# Patient Record
Sex: MALE | HISPANIC OR LATINO | Employment: OTHER | URBAN - METROPOLITAN AREA
[De-identification: names, ages, dates, MRNs, and addresses within clinical notes are randomized per-mention and may not be internally consistent; named-entity substitution may affect disease eponyms.]

---

## 2017-01-04 ENCOUNTER — GENERIC CONVERSION - ENCOUNTER (OUTPATIENT)
Dept: OTHER | Facility: OTHER | Age: 37
End: 2017-01-04

## 2017-02-06 ENCOUNTER — ALLSCRIPTS OFFICE VISIT (OUTPATIENT)
Dept: OTHER | Facility: OTHER | Age: 37
End: 2017-02-06

## 2017-02-06 ENCOUNTER — TRANSCRIBE ORDERS (OUTPATIENT)
Dept: ADMINISTRATIVE | Facility: HOSPITAL | Age: 37
End: 2017-02-06

## 2017-02-06 DIAGNOSIS — I50.42 CHRONIC COMBINED SYSTOLIC AND DIASTOLIC HEART FAILURE (HCC): Primary | ICD-10-CM

## 2017-03-06 DIAGNOSIS — I50.42 CHRONIC COMBINED SYSTOLIC AND DIASTOLIC HEART FAILURE (HCC): ICD-10-CM

## 2017-03-24 ENCOUNTER — APPOINTMENT (OUTPATIENT)
Dept: CT IMAGING | Facility: HOSPITAL | Age: 37
End: 2017-03-24

## 2017-03-24 ENCOUNTER — APPOINTMENT (EMERGENCY)
Dept: RADIOLOGY | Facility: HOSPITAL | Age: 37
End: 2017-03-24

## 2017-03-24 ENCOUNTER — GENERIC CONVERSION - ENCOUNTER (OUTPATIENT)
Dept: OTHER | Facility: OTHER | Age: 37
End: 2017-03-24

## 2017-03-24 ENCOUNTER — HOSPITAL ENCOUNTER (OUTPATIENT)
Facility: HOSPITAL | Age: 37
Setting detail: OBSERVATION
Discharge: LEFT BEFORE DISCHARGE | End: 2017-03-25
Attending: EMERGENCY MEDICINE | Admitting: INTERNAL MEDICINE

## 2017-03-24 DIAGNOSIS — I50.9 CONGESTIVE HEART FAILURE, UNSPECIFIED CONGESTIVE HEART FAILURE CHRONICITY, UNSPECIFIED CONGESTIVE HEART FAILURE TYPE: ICD-10-CM

## 2017-03-24 DIAGNOSIS — R07.9 CHEST PAIN: Primary | ICD-10-CM

## 2017-03-24 DIAGNOSIS — R07.9 EXERTIONAL CHEST PAIN: ICD-10-CM

## 2017-03-24 DIAGNOSIS — R77.8 ELEVATED TROPONIN: ICD-10-CM

## 2017-03-24 PROBLEM — R73.9 HYPERGLYCEMIA: Status: ACTIVE | Noted: 2017-03-24

## 2017-03-24 PROBLEM — D72.829 LEUKOCYTOSIS: Status: ACTIVE | Noted: 2017-03-24

## 2017-03-24 LAB
AMPHETAMINES SERPL QL SCN: NEGATIVE
ANION GAP SERPL CALCULATED.3IONS-SCNC: 9 MMOL/L (ref 4–13)
APTT PPP: 29 SECONDS (ref 24–36)
BACTERIA UR QL AUTO: ABNORMAL /HPF
BARBITURATES UR QL: NEGATIVE
BASOPHILS # BLD AUTO: 0.06 THOUSANDS/ΜL (ref 0–0.1)
BASOPHILS NFR BLD AUTO: 1 % (ref 0–1)
BENZODIAZ UR QL: NEGATIVE
BILIRUB UR QL STRIP: NEGATIVE
BUN SERPL-MCNC: 19 MG/DL (ref 5–25)
CALCIUM SERPL-MCNC: 9 MG/DL (ref 8.3–10.1)
CHLORIDE SERPL-SCNC: 102 MMOL/L (ref 100–108)
CLARITY UR: CLEAR
CO2 SERPL-SCNC: 29 MMOL/L (ref 21–32)
COCAINE UR QL: NEGATIVE
COLOR UR: YELLOW
CREAT SERPL-MCNC: 1.1 MG/DL (ref 0.6–1.3)
EOSINOPHIL # BLD AUTO: 0.25 THOUSAND/ΜL (ref 0–0.61)
EOSINOPHIL NFR BLD AUTO: 2 % (ref 0–6)
ERYTHROCYTE [DISTWIDTH] IN BLOOD BY AUTOMATED COUNT: 12.3 % (ref 11.6–15.1)
GFR SERPL CREATININE-BSD FRML MDRD: >60 ML/MIN/1.73SQ M
GLUCOSE SERPL-MCNC: 162 MG/DL (ref 65–140)
GLUCOSE UR STRIP-MCNC: NEGATIVE MG/DL
HCT VFR BLD AUTO: 53.2 % (ref 36.5–49.3)
HGB BLD-MCNC: 17.2 G/DL (ref 12–17)
HGB UR QL STRIP.AUTO: ABNORMAL
INR PPP: 1.01 (ref 0.86–1.16)
KETONES UR STRIP-MCNC: NEGATIVE MG/DL
LEUKOCYTE ESTERASE UR QL STRIP: NEGATIVE
LYMPHOCYTES # BLD AUTO: 3.05 THOUSANDS/ΜL (ref 0.6–4.47)
LYMPHOCYTES NFR BLD AUTO: 28 % (ref 14–44)
MCH RBC QN AUTO: 29.2 PG (ref 26.8–34.3)
MCHC RBC AUTO-ENTMCNC: 32.3 G/DL (ref 31.4–37.4)
MCV RBC AUTO: 90 FL (ref 82–98)
METHADONE UR QL: NEGATIVE
MONOCYTES # BLD AUTO: 1.25 THOUSAND/ΜL (ref 0.17–1.22)
MONOCYTES NFR BLD AUTO: 11 % (ref 4–12)
NEUTROPHILS # BLD AUTO: 6.32 THOUSANDS/ΜL (ref 1.85–7.62)
NEUTS SEG NFR BLD AUTO: 57 % (ref 43–75)
NITRITE UR QL STRIP: NEGATIVE
NON-SQ EPI CELLS URNS QL MICRO: ABNORMAL /HPF
NRBC BLD AUTO-RTO: 0 /100 WBCS
NT-PROBNP SERPL-MCNC: 45 PG/ML
OPIATES UR QL SCN: NEGATIVE
PCP UR QL: NEGATIVE
PH UR STRIP.AUTO: 6 [PH] (ref 4.5–8)
PLATELET # BLD AUTO: 223 THOUSANDS/UL (ref 149–390)
PMV BLD AUTO: 12.5 FL (ref 8.9–12.7)
POTASSIUM SERPL-SCNC: 4.3 MMOL/L (ref 3.5–5.3)
PROT UR STRIP-MCNC: NEGATIVE MG/DL
PROTHROMBIN TIME: 13.2 SECONDS (ref 12–14.3)
RBC # BLD AUTO: 5.89 MILLION/UL (ref 3.88–5.62)
RBC #/AREA URNS AUTO: ABNORMAL /HPF
SODIUM SERPL-SCNC: 140 MMOL/L (ref 136–145)
SP GR UR STRIP.AUTO: 1.01 (ref 1–1.03)
SPECIMEN SOURCE: ABNORMAL
THC UR QL: NEGATIVE
TROPONIN I BLD-MCNC: 0.09 NG/ML (ref 0–0.08)
TROPONIN I SERPL-MCNC: 0.03 NG/ML
UROBILINOGEN UR QL STRIP.AUTO: 0.2 E.U./DL
WBC # BLD AUTO: 11.01 THOUSAND/UL (ref 4.31–10.16)
WBC #/AREA URNS AUTO: ABNORMAL /HPF

## 2017-03-24 PROCEDURE — 84484 ASSAY OF TROPONIN QUANT: CPT | Performed by: PHYSICIAN ASSISTANT

## 2017-03-24 PROCEDURE — 83880 ASSAY OF NATRIURETIC PEPTIDE: CPT | Performed by: EMERGENCY MEDICINE

## 2017-03-24 PROCEDURE — 85610 PROTHROMBIN TIME: CPT | Performed by: EMERGENCY MEDICINE

## 2017-03-24 PROCEDURE — 80307 DRUG TEST PRSMV CHEM ANLYZR: CPT | Performed by: PHYSICIAN ASSISTANT

## 2017-03-24 PROCEDURE — 93005 ELECTROCARDIOGRAM TRACING: CPT | Performed by: EMERGENCY MEDICINE

## 2017-03-24 PROCEDURE — 81001 URINALYSIS AUTO W/SCOPE: CPT | Performed by: PHYSICIAN ASSISTANT

## 2017-03-24 PROCEDURE — 36415 COLL VENOUS BLD VENIPUNCTURE: CPT | Performed by: EMERGENCY MEDICINE

## 2017-03-24 PROCEDURE — 84484 ASSAY OF TROPONIN QUANT: CPT

## 2017-03-24 PROCEDURE — 71020 HB CHEST X-RAY 2VW FRONTAL&LATL: CPT

## 2017-03-24 PROCEDURE — 80048 BASIC METABOLIC PNL TOTAL CA: CPT | Performed by: EMERGENCY MEDICINE

## 2017-03-24 PROCEDURE — 85025 COMPLETE CBC W/AUTO DIFF WBC: CPT | Performed by: EMERGENCY MEDICINE

## 2017-03-24 PROCEDURE — 71275 CT ANGIOGRAPHY CHEST: CPT

## 2017-03-24 PROCEDURE — 85730 THROMBOPLASTIN TIME PARTIAL: CPT | Performed by: EMERGENCY MEDICINE

## 2017-03-24 RX ORDER — FUROSEMIDE 20 MG/1
30 TABLET ORAL 2 TIMES DAILY
Status: ON HOLD | COMMUNITY
End: 2018-06-21

## 2017-03-24 RX ORDER — FUROSEMIDE 20 MG/1
30 TABLET ORAL 2 TIMES DAILY
Status: DISCONTINUED | OUTPATIENT
Start: 2017-03-24 | End: 2017-03-25 | Stop reason: HOSPADM

## 2017-03-24 RX ORDER — MORPHINE SULFATE 2 MG/ML
1 INJECTION, SOLUTION INTRAMUSCULAR; INTRAVENOUS EVERY 6 HOURS PRN
Status: DISCONTINUED | OUTPATIENT
Start: 2017-03-24 | End: 2017-03-25 | Stop reason: HOSPADM

## 2017-03-24 RX ORDER — ACETAMINOPHEN 325 MG/1
650 TABLET ORAL EVERY 4 HOURS PRN
Status: DISCONTINUED | OUTPATIENT
Start: 2017-03-24 | End: 2017-03-25 | Stop reason: HOSPADM

## 2017-03-24 RX ORDER — ASPIRIN 81 MG/1
81 TABLET, CHEWABLE ORAL DAILY
Status: DISCONTINUED | OUTPATIENT
Start: 2017-03-25 | End: 2017-03-25 | Stop reason: HOSPADM

## 2017-03-24 RX ORDER — SPIRONOLACTONE 25 MG/1
25 TABLET ORAL DAILY
Status: DISCONTINUED | OUTPATIENT
Start: 2017-03-25 | End: 2017-03-25 | Stop reason: HOSPADM

## 2017-03-24 RX ORDER — SPIRONOLACTONE 25 MG/1
25 TABLET ORAL DAILY
COMMUNITY
End: 2018-05-11 | Stop reason: SDUPTHER

## 2017-03-24 RX ORDER — NITROGLYCERIN 0.4 MG/1
0.4 TABLET SUBLINGUAL
Status: DISCONTINUED | OUTPATIENT
Start: 2017-03-24 | End: 2017-03-25 | Stop reason: HOSPADM

## 2017-03-24 RX ORDER — LISINOPRIL 20 MG/1
20 TABLET ORAL DAILY
COMMUNITY
End: 2018-06-22 | Stop reason: HOSPADM

## 2017-03-24 RX ORDER — METOPROLOL SUCCINATE 100 MG/1
150 TABLET, EXTENDED RELEASE ORAL DAILY
COMMUNITY
End: 2018-04-06 | Stop reason: SDUPTHER

## 2017-03-24 RX ORDER — ONDANSETRON 2 MG/ML
4 INJECTION INTRAMUSCULAR; INTRAVENOUS EVERY 6 HOURS PRN
Status: DISCONTINUED | OUTPATIENT
Start: 2017-03-24 | End: 2017-03-24

## 2017-03-24 RX ORDER — ASPIRIN 81 MG/1
324 TABLET, CHEWABLE ORAL ONCE
Status: COMPLETED | OUTPATIENT
Start: 2017-03-24 | End: 2017-03-24

## 2017-03-24 RX ADMIN — ASPIRIN 81 MG CHEWABLE TABLET 324 MG: 81 TABLET CHEWABLE at 20:36

## 2017-03-24 RX ADMIN — FUROSEMIDE 30 MG: 20 TABLET ORAL at 21:09

## 2017-03-24 RX ADMIN — IOHEXOL 85 ML: 350 INJECTION, SOLUTION INTRAVENOUS at 21:21

## 2017-03-25 VITALS
OXYGEN SATURATION: 96 % | HEART RATE: 93 BPM | TEMPERATURE: 97.7 F | WEIGHT: 280 LBS | RESPIRATION RATE: 18 BRPM | DIASTOLIC BLOOD PRESSURE: 80 MMHG | SYSTOLIC BLOOD PRESSURE: 135 MMHG

## 2017-03-25 PROBLEM — I50.42 CHRONIC COMBINED SYSTOLIC AND DIASTOLIC CONGESTIVE HEART FAILURE (HCC): Status: ACTIVE | Noted: 2017-03-25

## 2017-03-25 PROBLEM — I20.8 ANGINA OF EFFORT (HCC): Status: ACTIVE | Noted: 2017-03-25

## 2017-03-25 PROBLEM — K76.0 FATTY LIVER DISEASE, NONALCOHOLIC: Status: ACTIVE | Noted: 2017-03-25

## 2017-03-25 PROBLEM — I42.0 CARDIOMYOPATHY, DILATED, NONISCHEMIC (HCC): Status: ACTIVE | Noted: 2017-03-25

## 2017-03-25 PROBLEM — R91.1 INCIDENTAL PULMONARY NODULE, > 3MM AND < 8MM: Status: ACTIVE | Noted: 2017-03-25

## 2017-03-25 PROBLEM — R91.1 INCIDENTAL PULMONARY NODULE, GREATER THAN OR EQUAL TO 8MM: Status: ACTIVE | Noted: 2017-03-25

## 2017-03-25 LAB
ALBUMIN SERPL BCP-MCNC: 3.7 G/DL (ref 3.5–5)
ALP SERPL-CCNC: 113 U/L (ref 46–116)
ALT SERPL W P-5'-P-CCNC: 62 U/L (ref 12–78)
ANION GAP SERPL CALCULATED.3IONS-SCNC: 9 MMOL/L (ref 4–13)
APTT PPP: 20 SECONDS (ref 24–36)
AST SERPL W P-5'-P-CCNC: 27 U/L (ref 5–45)
BILIRUB DIRECT SERPL-MCNC: 0.16 MG/DL (ref 0–0.2)
BILIRUB SERPL-MCNC: 0.7 MG/DL (ref 0.2–1)
BUN SERPL-MCNC: 18 MG/DL (ref 5–25)
CALCIUM SERPL-MCNC: 8.3 MG/DL (ref 8.3–10.1)
CHLORIDE SERPL-SCNC: 101 MMOL/L (ref 100–108)
CHOLEST SERPL-MCNC: 189 MG/DL (ref 50–200)
CO2 SERPL-SCNC: 23 MMOL/L (ref 21–32)
CREAT SERPL-MCNC: 1.12 MG/DL (ref 0.6–1.3)
ERYTHROCYTE [DISTWIDTH] IN BLOOD BY AUTOMATED COUNT: 12.5 % (ref 11.6–15.1)
EST. AVERAGE GLUCOSE BLD GHB EST-MCNC: 160 MG/DL
GFR SERPL CREATININE-BSD FRML MDRD: >60 ML/MIN/1.73SQ M
GLUCOSE SERPL-MCNC: 247 MG/DL (ref 65–140)
HBA1C MFR BLD: 7.2 % (ref 4.2–6.3)
HCT VFR BLD AUTO: 53.5 % (ref 36.5–49.3)
HDLC SERPL-MCNC: 32 MG/DL (ref 40–60)
HGB BLD-MCNC: 16.8 G/DL (ref 12–17)
INR PPP: 1.04 (ref 0.86–1.16)
LDLC SERPL CALC-MCNC: 118 MG/DL (ref 0–100)
MAGNESIUM SERPL-MCNC: 2.2 MG/DL (ref 1.6–2.6)
MCH RBC QN AUTO: 29.2 PG (ref 26.8–34.3)
MCHC RBC AUTO-ENTMCNC: 31.4 G/DL (ref 31.4–37.4)
MCV RBC AUTO: 93 FL (ref 82–98)
PLATELET # BLD AUTO: 205 THOUSANDS/UL (ref 149–390)
PMV BLD AUTO: 11.9 FL (ref 8.9–12.7)
POTASSIUM SERPL-SCNC: 5.7 MMOL/L (ref 3.5–5.3)
PROT SERPL-MCNC: 8.3 G/DL (ref 6.4–8.2)
PROTHROMBIN TIME: 13.5 SECONDS (ref 12–14.3)
RBC # BLD AUTO: 5.75 MILLION/UL (ref 3.88–5.62)
SODIUM SERPL-SCNC: 133 MMOL/L (ref 136–145)
TRIGL SERPL-MCNC: 197 MG/DL
TROPONIN I SERPL-MCNC: 0.02 NG/ML
TROPONIN I SERPL-MCNC: 0.03 NG/ML
WBC # BLD AUTO: 12.67 THOUSAND/UL (ref 4.31–10.16)

## 2017-03-25 PROCEDURE — 83735 ASSAY OF MAGNESIUM: CPT | Performed by: PHYSICIAN ASSISTANT

## 2017-03-25 PROCEDURE — 84484 ASSAY OF TROPONIN QUANT: CPT | Performed by: PHYSICIAN ASSISTANT

## 2017-03-25 PROCEDURE — 99285 EMERGENCY DEPT VISIT HI MDM: CPT

## 2017-03-25 PROCEDURE — 86705 HEP B CORE ANTIBODY IGM: CPT | Performed by: INTERNAL MEDICINE

## 2017-03-25 PROCEDURE — 36415 COLL VENOUS BLD VENIPUNCTURE: CPT | Performed by: PHYSICIAN ASSISTANT

## 2017-03-25 PROCEDURE — 85610 PROTHROMBIN TIME: CPT | Performed by: PHYSICIAN ASSISTANT

## 2017-03-25 PROCEDURE — 85730 THROMBOPLASTIN TIME PARTIAL: CPT | Performed by: PHYSICIAN ASSISTANT

## 2017-03-25 PROCEDURE — 86803 HEPATITIS C AB TEST: CPT | Performed by: INTERNAL MEDICINE

## 2017-03-25 PROCEDURE — 87340 HEPATITIS B SURFACE AG IA: CPT | Performed by: INTERNAL MEDICINE

## 2017-03-25 PROCEDURE — 86704 HEP B CORE ANTIBODY TOTAL: CPT | Performed by: INTERNAL MEDICINE

## 2017-03-25 PROCEDURE — 80048 BASIC METABOLIC PNL TOTAL CA: CPT | Performed by: PHYSICIAN ASSISTANT

## 2017-03-25 PROCEDURE — 80061 LIPID PANEL: CPT | Performed by: PHYSICIAN ASSISTANT

## 2017-03-25 PROCEDURE — 83036 HEMOGLOBIN GLYCOSYLATED A1C: CPT | Performed by: PHYSICIAN ASSISTANT

## 2017-03-25 PROCEDURE — 85027 COMPLETE CBC AUTOMATED: CPT | Performed by: PHYSICIAN ASSISTANT

## 2017-03-25 PROCEDURE — 80076 HEPATIC FUNCTION PANEL: CPT | Performed by: INTERNAL MEDICINE

## 2017-03-25 RX ORDER — ASPIRIN 81 MG/1
81 TABLET, CHEWABLE ORAL DAILY
Qty: 30 TABLET | Refills: 0 | Status: ON HOLD | COMMUNITY
Start: 2017-03-25 | End: 2018-06-21

## 2017-03-25 RX ADMIN — ASPIRIN 81 MG CHEWABLE TABLET 81 MG: 81 TABLET CHEWABLE at 09:14

## 2017-03-25 RX ADMIN — FUROSEMIDE 30 MG: 20 TABLET ORAL at 09:14

## 2017-03-25 RX ADMIN — SPIRONOLACTONE 25 MG: 25 TABLET, FILM COATED ORAL at 09:11

## 2017-03-25 RX ADMIN — METOPROLOL SUCCINATE 150 MG: 50 TABLET, FILM COATED, EXTENDED RELEASE ORAL at 09:10

## 2017-03-25 RX ADMIN — LISINOPRIL 25 MG: 20 TABLET ORAL at 09:09

## 2017-03-26 LAB
HBV CORE AB SER QL: NORMAL
HBV CORE IGM SER QL: NORMAL
HBV SURFACE AG SER QL: NORMAL
HCV AB SER QL: NORMAL

## 2017-04-04 LAB
ATRIAL RATE: 100 BPM
P AXIS: 52 DEGREES
PR INTERVAL: 134 MS
QRS AXIS: -25 DEGREES
QRSD INTERVAL: 96 MS
QT INTERVAL: 370 MS
QTC INTERVAL: 477 MS
T WAVE AXIS: 56 DEGREES
VENTRICULAR RATE: 100 BPM

## 2018-01-09 NOTE — MISCELLANEOUS
To whom it may concern,             Mr Yulisa Ríos is a patient of mine whom I treat for cardiomyopathy and chronic systolic CHF  He is not do do any shoveling  He may have trouble at times climbing up into   his  truck based on exacerbations of CHFand hand and feet swelling  Please excuse him on these days   Thank you,    Sincerely,  Berlin GARCIA   [phone]      Electronically signed by:Conner Chapman DO  Jan 9 2017  2:58PM EST      Electronically signed by:Conner Chapman DO  Jan 9 2017  2:58PM EST Author

## 2018-01-14 VITALS
DIASTOLIC BLOOD PRESSURE: 86 MMHG | HEIGHT: 70 IN | HEART RATE: 84 BPM | SYSTOLIC BLOOD PRESSURE: 140 MMHG | OXYGEN SATURATION: 98 % | WEIGHT: 284.44 LBS | BODY MASS INDEX: 40.72 KG/M2

## 2018-04-06 DIAGNOSIS — I42.9 CARDIOMYOPATHY, UNSPECIFIED TYPE (HCC): Primary | ICD-10-CM

## 2018-04-06 RX ORDER — METOPROLOL SUCCINATE 100 MG/1
150 TABLET, EXTENDED RELEASE ORAL DAILY
Qty: 45 TABLET | Refills: 5 | Status: SHIPPED | OUTPATIENT
Start: 2018-04-06 | End: 2018-11-02 | Stop reason: SDUPTHER

## 2018-05-11 DIAGNOSIS — I10 HYPERTENSION, UNSPECIFIED TYPE: Primary | ICD-10-CM

## 2018-05-11 RX ORDER — SPIRONOLACTONE 25 MG/1
25 TABLET ORAL DAILY
Qty: 90 TABLET | Refills: 5 | Status: SHIPPED | OUTPATIENT
Start: 2018-05-11 | End: 2019-06-27 | Stop reason: SDUPTHER

## 2018-06-19 ENCOUNTER — APPOINTMENT (EMERGENCY)
Dept: CT IMAGING | Facility: HOSPITAL | Age: 38
End: 2018-06-19
Payer: COMMERCIAL

## 2018-06-19 ENCOUNTER — APPOINTMENT (EMERGENCY)
Dept: RADIOLOGY | Facility: HOSPITAL | Age: 38
End: 2018-06-19
Payer: COMMERCIAL

## 2018-06-19 ENCOUNTER — HOSPITAL ENCOUNTER (OUTPATIENT)
Facility: HOSPITAL | Age: 38
Setting detail: OBSERVATION
Discharge: HOME | End: 2018-06-22
Attending: EMERGENCY MEDICINE | Admitting: INTERNAL MEDICINE
Payer: COMMERCIAL

## 2018-06-19 DIAGNOSIS — I42.0 CARDIOMYOPATHY, DILATED, NONISCHEMIC (HCC): ICD-10-CM

## 2018-06-19 DIAGNOSIS — R07.9 CHEST PAIN: Primary | ICD-10-CM

## 2018-06-19 DIAGNOSIS — R94.31 ABNORMAL EKG: ICD-10-CM

## 2018-06-19 PROBLEM — I50.22 CHRONIC SYSTOLIC HEART FAILURE (HCC): Chronic | Status: ACTIVE | Noted: 2018-06-19

## 2018-06-19 LAB
ALBUMIN SERPL BCP-MCNC: 4.1 G/DL (ref 3.5–5)
ALP SERPL-CCNC: 85 U/L (ref 46–116)
ALT SERPL W P-5'-P-CCNC: 35 U/L (ref 12–78)
ANION GAP SERPL CALCULATED.3IONS-SCNC: 7 MMOL/L (ref 4–13)
AST SERPL W P-5'-P-CCNC: 20 U/L (ref 5–45)
ATRIAL RATE: 101 BPM
BASOPHILS # BLD AUTO: 0.05 THOUSANDS/ΜL (ref 0–0.1)
BASOPHILS NFR BLD AUTO: 0 % (ref 0–1)
BILIRUB SERPL-MCNC: 0.7 MG/DL (ref 0.2–1)
BUN SERPL-MCNC: 20 MG/DL (ref 5–25)
CALCIUM SERPL-MCNC: 9.1 MG/DL (ref 8.3–10.1)
CHLORIDE SERPL-SCNC: 102 MMOL/L (ref 100–108)
CO2 SERPL-SCNC: 30 MMOL/L (ref 21–32)
CREAT SERPL-MCNC: 1.44 MG/DL (ref 0.6–1.3)
DEPRECATED D DIMER PPP: 490 NG/ML (FEU) (ref 0–424)
EOSINOPHIL # BLD AUTO: 0.15 THOUSAND/ΜL (ref 0–0.61)
EOSINOPHIL NFR BLD AUTO: 1 % (ref 0–6)
ERYTHROCYTE [DISTWIDTH] IN BLOOD BY AUTOMATED COUNT: 12.6 % (ref 11.6–15.1)
GFR SERPL CREATININE-BSD FRML MDRD: 61 ML/MIN/1.73SQ M
GLUCOSE SERPL-MCNC: 113 MG/DL (ref 65–140)
GLUCOSE SERPL-MCNC: 170 MG/DL (ref 65–140)
GLUCOSE SERPL-MCNC: 94 MG/DL (ref 65–140)
GLUCOSE SERPL-MCNC: 98 MG/DL (ref 65–140)
HCT VFR BLD AUTO: 52.2 % (ref 36.5–49.3)
HGB BLD-MCNC: 16.9 G/DL (ref 12–17)
IMM GRANULOCYTES # BLD AUTO: 0.04 THOUSAND/UL (ref 0–0.2)
IMM GRANULOCYTES NFR BLD AUTO: 0 % (ref 0–2)
LYMPHOCYTES # BLD AUTO: 1.7 THOUSANDS/ΜL (ref 0.6–4.47)
LYMPHOCYTES NFR BLD AUTO: 14 % (ref 14–44)
MCH RBC QN AUTO: 29 PG (ref 26.8–34.3)
MCHC RBC AUTO-ENTMCNC: 32.4 G/DL (ref 31.4–37.4)
MCV RBC AUTO: 90 FL (ref 82–98)
MONOCYTES # BLD AUTO: 1.3 THOUSAND/ΜL (ref 0.17–1.22)
MONOCYTES NFR BLD AUTO: 11 % (ref 4–12)
NEUTROPHILS # BLD AUTO: 8.82 THOUSANDS/ΜL (ref 1.85–7.62)
NEUTS SEG NFR BLD AUTO: 74 % (ref 43–75)
NRBC BLD AUTO-RTO: 0 /100 WBCS
NT-PROBNP SERPL-MCNC: 159 PG/ML
P AXIS: 66 DEGREES
PLATELET # BLD AUTO: 225 THOUSANDS/UL (ref 149–390)
PMV BLD AUTO: 11 FL (ref 8.9–12.7)
POTASSIUM SERPL-SCNC: 4 MMOL/L (ref 3.5–5.3)
PR INTERVAL: 140 MS
PROT SERPL-MCNC: 8.6 G/DL (ref 6.4–8.2)
QRS AXIS: 13 DEGREES
QRSD INTERVAL: 96 MS
QT INTERVAL: 374 MS
QTC INTERVAL: 484 MS
RBC # BLD AUTO: 5.83 MILLION/UL (ref 3.88–5.62)
SODIUM SERPL-SCNC: 139 MMOL/L (ref 136–145)
T WAVE AXIS: 58 DEGREES
TROPONIN I SERPL-MCNC: 0.02 NG/ML
TROPONIN I SERPL-MCNC: 0.05 NG/ML
VENTRICULAR RATE: 101 BPM
WBC # BLD AUTO: 12.06 THOUSAND/UL (ref 4.31–10.16)

## 2018-06-19 PROCEDURE — 71275 CT ANGIOGRAPHY CHEST: CPT

## 2018-06-19 PROCEDURE — 83880 ASSAY OF NATRIURETIC PEPTIDE: CPT | Performed by: EMERGENCY MEDICINE

## 2018-06-19 PROCEDURE — 99220 PR INITIAL OBSERVATION CARE/DAY 70 MINUTES: CPT | Performed by: INTERNAL MEDICINE

## 2018-06-19 PROCEDURE — 36415 COLL VENOUS BLD VENIPUNCTURE: CPT | Performed by: EMERGENCY MEDICINE

## 2018-06-19 PROCEDURE — 84484 ASSAY OF TROPONIN QUANT: CPT | Performed by: EMERGENCY MEDICINE

## 2018-06-19 PROCEDURE — 85025 COMPLETE CBC W/AUTO DIFF WBC: CPT | Performed by: EMERGENCY MEDICINE

## 2018-06-19 PROCEDURE — 71046 X-RAY EXAM CHEST 2 VIEWS: CPT

## 2018-06-19 PROCEDURE — 80053 COMPREHEN METABOLIC PANEL: CPT | Performed by: EMERGENCY MEDICINE

## 2018-06-19 PROCEDURE — 96374 THER/PROPH/DIAG INJ IV PUSH: CPT

## 2018-06-19 PROCEDURE — 93005 ELECTROCARDIOGRAM TRACING: CPT

## 2018-06-19 PROCEDURE — 99285 EMERGENCY DEPT VISIT HI MDM: CPT

## 2018-06-19 PROCEDURE — 93010 ELECTROCARDIOGRAM REPORT: CPT | Performed by: INTERNAL MEDICINE

## 2018-06-19 PROCEDURE — 85379 FIBRIN DEGRADATION QUANT: CPT | Performed by: EMERGENCY MEDICINE

## 2018-06-19 PROCEDURE — 82948 REAGENT STRIP/BLOOD GLUCOSE: CPT

## 2018-06-19 PROCEDURE — 84484 ASSAY OF TROPONIN QUANT: CPT | Performed by: INTERNAL MEDICINE

## 2018-06-19 RX ORDER — FUROSEMIDE 20 MG/1
30 TABLET ORAL 2 TIMES DAILY
Status: DISCONTINUED | OUTPATIENT
Start: 2018-06-19 | End: 2018-06-21

## 2018-06-19 RX ORDER — ACETAMINOPHEN 325 MG/1
650 TABLET ORAL EVERY 6 HOURS PRN
Status: DISCONTINUED | OUTPATIENT
Start: 2018-06-19 | End: 2018-06-22 | Stop reason: HOSPADM

## 2018-06-19 RX ORDER — KETOROLAC TROMETHAMINE 30 MG/ML
15 INJECTION, SOLUTION INTRAMUSCULAR; INTRAVENOUS ONCE
Status: COMPLETED | OUTPATIENT
Start: 2018-06-19 | End: 2018-06-19

## 2018-06-19 RX ORDER — ASPIRIN 81 MG/1
81 TABLET ORAL DAILY
Status: DISCONTINUED | OUTPATIENT
Start: 2018-06-20 | End: 2018-06-22 | Stop reason: HOSPADM

## 2018-06-19 RX ORDER — NITROGLYCERIN 0.4 MG/1
0.4 TABLET SUBLINGUAL
Status: DISCONTINUED | OUTPATIENT
Start: 2018-06-19 | End: 2018-06-22 | Stop reason: HOSPADM

## 2018-06-19 RX ORDER — ASPIRIN 81 MG/1
324 TABLET, CHEWABLE ORAL ONCE
Status: COMPLETED | OUTPATIENT
Start: 2018-06-19 | End: 2018-06-19

## 2018-06-19 RX ADMIN — IOHEXOL 85 ML: 350 INJECTION, SOLUTION INTRAVENOUS at 15:22

## 2018-06-19 RX ADMIN — FUROSEMIDE 30 MG: 20 TABLET ORAL at 21:12

## 2018-06-19 RX ADMIN — KETOROLAC TROMETHAMINE 15 MG: 30 INJECTION, SOLUTION INTRAMUSCULAR at 14:24

## 2018-06-19 RX ADMIN — ASPIRIN 81 MG 324 MG: 81 TABLET ORAL at 15:12

## 2018-06-19 RX ADMIN — SODIUM CHLORIDE 500 ML: 0.9 INJECTION, SOLUTION INTRAVENOUS at 16:56

## 2018-06-19 NOTE — H&P
History and Physical - [facility] Internal Medicine    Patient Information: Feng Arriaga 42 y o  male MRN: [id number]  Unit/Bed#: -01 Encounter: [id number]  Admitting Physician: Beulah Arriola DO  PCP: Vicky Mccarthy MD  Date of Admission:  06/19/18    Assessment/Plan:    Hospital Problem List:     Principal Problem:    Chest pain  Active Problems:    Chronic systolic heart failure ([address] )      Plan for the Primary Problem(s):    # CP - sounds atypical however significant risk factors and given EKG changes will admit to r/o for acs  - consult cardiology  - cardiac cath in 2015 no evidence of disease  - Obtain echocardiogram, NM stress  - Serial troponin  - prn nitro  - ASA daily  - Further risk stratify pt thus will obtain lipid profile  - cont telemetry monitoring  - Ruled out for PE    Plan for Additional Problems:   # chronic systolic HF, ef of 12-52 %, NICM s/p cardiac cath   - compensated  - Most recent echo in 2015, is actually due to have one since 2017 but has been non compliant will obtain now  - cont lasix    # CKD stg III - though slight cr bump from last reading which was in 3/2017 still w/in baseline range  - will hold lisinopril/aldactone for now as bp on low side as well; but if cr remains stable would rec to cont    # Leukocytosis - chronic, no sign of acute infectious process; further wkup as outpatient    # DM II, - diet controlled, last HgA1c dof 7 2 (3/2017)   - ISS  - Check hgA1c    # HTN - stable    # Pulmonary nodule RML, known hx - from last admission, size remains the same from prior imaging  - serial imaging yearly    VTE Prophylaxis: Low risk  / sequential compression device   Code Status:  Full code  POLST: There is no POLST form on file for this patient (pre-hospital)    Anticipated Length of Stay:  Patient will be admitted on an Observation basis with an anticipated length of stay of  less than 2 midnights     Justification for Hospital Stay:  Rule out ACS    Total Time for Visit, including Counseling / Coordination of Care: 1 hour  Greater than 50% of this total time spent on direct patient counseling and coordination of care  Chief Complaint:   Chest pain    History of Present Illness:    Umm Altamirano is a 42 y o  male medical history significant for 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, hypertension, type 2 diabetes presents with chest pain  Patient states he was in his usual state of health until this afternoon  He had gotten out of his his truck into his car , he states that due to the ambient weather he felt extremely hot  Subsequently had chest pain/tightness in midsternum, no radiation associated with mild shortness of breath  States that his symptoms persisted until he presented to the ED  At this point he says his symptoms have essentially resolved  He had been admitted in 2017 for chest pain plan was to proceed with stress test however patient signed out against medical advice  Due to his history of heart failure follows up with Dr Brandy Reilly but he admits that he has not seen him in over a year  He has also been ordered for an echocardiogram which he has been noncompliant with as well     Review of Systems:    Review of Systems   Constitutional: Negative  HENT: Negative  Eyes: Negative  Respiratory: Negative  Cardiovascular: Positive for chest pain  Gastrointestinal: Negative  Endocrine: Negative  Genitourinary: Negative  Musculoskeletal: Negative  Skin: Negative  Allergic/Immunologic: Negative  Neurological: Negative  Hematological: Negative  Psychiatric/Behavioral: Negative          Past Medical and Surgical History:     Past Medical History:   Diagnosis Date    CHF (congestive heart failure) (HCC)     Chronic systolic heart failure EF of 40-45%  Type 2 diabetes, non insulin dependent, diet controlled  Hypertension  Chronic kidney disease stage 3    Past Surgical History:   Procedure Laterality Date    CARDIAC CATHETERIZATION         Meds/Allergies:    Prior to Admission medications    Medication Sig Start Date End Date Taking? Authorizing Provider   aspirin 81 mg chewable tablet Chew 1 tablet daily for 30 days 3/25/17 4/24/17  Dima Ramos MD   furosemide (LASIX) 20 mg tablet Take 30 mg by mouth 2 (two) times a day    Historical Provider, MD   lisinopril (ZESTRIL) 20 mg tablet Take 20 mg by mouth daily      Historical Provider, MD   metoprolol succinate (TOPROL-XL) 100 mg 24 hr tablet Take 1 5 tablets (150 mg total) by mouth daily 4/6/18 4/6/19  Maki Ramos DO   spironolactone (ALDACTONE) 25 mg tablet Take 1 tablet (25 mg total) by mouth daily 5/11/18   Maki Ramos DO     I have reviewed home medications with patient personally  Allergies:    Allergies   Allergen Reactions    Prednisone Shortness Of Breath and Edema       Social History:     Marital Status: /Civil Union   Occupation: Works as a   Patient Pre-hospital Living Situation: Resides with wife  Patient Pre-hospital Level of Mobility: Independent  Patient Pre-hospital Diet Restrictions: none  Substance Use History:   History   Alcohol Use No     History   Smoking Status    Former Smoker   Smokeless Tobacco    Never Used     History   Drug Use No       Family History:    non-contributory    Physical Exam:     Vitals:   Blood Pressure: 116/59 (06/19/18 1657)  Pulse: 81 (06/19/18 1657)  Temperature: 99 °F (37 2 °C) (06/19/18 1314)  Temp Source: Oral (06/19/18 1314)  Respirations: 16 (06/19/18 1657)  Height: 5' 10" (177 8 cm) (06/19/18 1314)  Weight - Scale: 113 kg (250 lb) (06/19/18 1314)  SpO2: 94 % (06/19/18 1657)    General Appearance:  Alert, cooperative, no distress, appears stated age   Head:  Normocephalic, without obvious abnormality, atraumatic   Neck: Supple   Lungs:   Clear to auscultation bilaterally, respirations unlabored   Chest Wall:  No tenderness or deformity    Heart:  Regular rate and rhythm, S1 and S2 normal, no murmur, rub or gallop   Abdomen:   Soft, non-tender, bowel sounds active all four quadrants,  no masses, no organomegaly   Extremities: Extremities normal, atraumatic, no cyanosis or edema   Pulses: 2+ and symmetric all extremities   Skin: Skin color, texture, turgor normal, no rashes or lesions   Lymph nodes:    Neurologic: CNII-XII intact, speech fluent, comprehensible, no facial asymmetry; normal strength 5/5 in all major muscle groups, sensation and reflexes throughout       Additional Data:     Lab Results: I have personally reviewed pertinent reports  Results from last 7 days  Lab Units 06/19/18  1422   WBC Thousand/uL 12 06*   HEMOGLOBIN g/dL 16 9   HEMATOCRIT % 52 2*   PLATELETS Thousands/uL 225   NEUTROS PCT % 74   LYMPHS PCT % 14   MONOS PCT % 11   EOS PCT % 1       Results from last 7 days  Lab Units 06/19/18  1422   SODIUM mmol/L 139   POTASSIUM mmol/L 4 0   CHLORIDE mmol/L 102   CO2 mmol/L 30   BUN mg/dL 20   CREATININE mg/dL 1 44*   CALCIUM mg/dL 9 1   TOTAL PROTEIN g/dL 8 6*   BILIRUBIN TOTAL mg/dL 0 70   ALK PHOS U/L 85   ALT U/L 35   AST U/L 20   GLUCOSE RANDOM mg/dL 98           Imaging: I have personally reviewed pertinent reports  Xr Chest 2 Views    Result Date: 6/19/2018  Narrative: CHEST INDICATION:   chest pain  Shortness of breath  COMPARISON:  March 24, 2017  EXAM PERFORMED/VIEWS:  XR CHEST PA & LATERAL FINDINGS: Cardiomediastinal silhouette appears unremarkable  The lungs are clear  No pneumothorax or pleural effusion  Osseous structures appear within normal limits for patient age  Impression: No acute cardiopulmonary disease  Workstation performed: [id number]     [facility] Ed Chest Pe Study    Result Date: 6/19/2018  Narrative: CTA - CHEST WITH IV CONTRAST - PULMONARY ANGIOGRAM INDICATION:   Sudden onset chest pain/pressure    COMPARISON: 5/24/2017   TECHNIQUE: CTA examination of the chest was performed using angiographic technique according to a protocol specifically tailored to evaluate for pulmonary embolism  Axial, sagittal, and coronal 2D reformatted images were created from the source data and  submitted for interpretation  In addition, coronal 3D MIP postprocessing was performed on the acquisition scanner  Radiation dose length product (DLP) for this visit:  546 mGy-cm   This examination, like all CT scans performed in the [facility], was performed utilizing techniques to minimize radiation dose exposure, including the use of iterative reconstruction and automated exposure control  IV Contrast:  85 mL of iohexol (OMNIPAQUE)  FINDINGS: PULMONARY ARTERIAL TREE:  No pulmonary embolus is seen  LUNGS:  No infiltrates  Stable 7 mm right middle lobe nodule on image 3/36  There is no tracheal or endobronchial lesion  PLEURA:  Unremarkable  HEART/AORTA:  Top normal heart size  No pericardial effusion  Normal thoracic aorta  MEDIASTINUM AND JENNI:  Unremarkable  CHEST WALL AND LOWER NECK:   Unremarkable  VISUALIZED STRUCTURES IN THE UPPER ABDOMEN:  Fatty infiltration of the liver again noted  OSSEOUS STRUCTURES:  No acute fracture or destructive osseous lesion  Impression: No acute pathology  No pulmonary embolism  Stable 7 mm right middle lobe nodule  Fatty liver  Workstation performed: [id number]       EKG, Pathology, and Other Studies Reviewed on Admission:   · EKG: ST @ 101 bpm, nl axis, TWI in leads V5-V6    [employer]scripts Records Reviewed: Yes     ** Please Note: Dragon 360 Dictation voice to text software may have been used in the creation of this document   **

## 2018-06-19 NOTE — ED PROVIDER NOTES
History  Chief Complaint   Patient presents with    Chest Pain     Patient presents with CP that began suddenly about 1 hour ago  Describes the pain as a tight pressure  HPI    This is a 41 yo M who presents today with chest pain  Patient states he had similar chest pain which is worse with exertion and had it today as well  States feels pressure and tightness substernal  Hx of systolic and diastolic heart failure with cardiomyopathy  Patient also states some shortness of breath with these symptoms  No fever or chills  No weakness  States no hx of DVT or PE but patient sedentary as a   No drug use  Impression: chest pain, cardiac workup  Since patient has multiple risk factors, will admit patient  EKG new depressions in V5, V6  D-dimer since low risk     Prior to Admission Medications   Prescriptions Last Dose Informant Patient Reported? Taking?   furosemide (LASIX) 40 mg tablet   Yes Yes   Sig: Take 40 mg by mouth every evening   furosemide (LASIX) 40 mg tablet   Yes Yes   Sig: Take 80 mg by mouth every evening   lisinopril (ZESTRIL) 20 mg tablet   Yes No   Sig: Take 20 mg by mouth daily     metoprolol succinate (TOPROL-XL) 100 mg 24 hr tablet   No No   Sig: Take 1 5 tablets (150 mg total) by mouth daily   spironolactone (ALDACTONE) 25 mg tablet   No No   Sig: Take 1 tablet (25 mg total) by mouth daily      Facility-Administered Medications: None       Past Medical History:   Diagnosis Date    CHF (congestive heart failure) ([facility] [address] )        Past Surgical History:   Procedure Laterality Date    CARDIAC CATHETERIZATION         History reviewed  No pertinent family history  I have reviewed and agree with the history as documented  Social History   Substance Use Topics    Smoking status: Former Smoker    Smokeless tobacco: Never Used    Alcohol use No        Review of Systems   Constitutional: Negative  Negative for diaphoresis and fever  HENT: Negative      Respiratory: Positive for shortness of breath  Negative for cough and wheezing  Cardiovascular: Positive for chest pain  Negative for palpitations and leg swelling  Gastrointestinal: Negative for abdominal distention, abdominal pain, nausea and vomiting  Genitourinary: Negative  Musculoskeletal: Negative  Skin: Negative  Neurological: Negative  Psychiatric/Behavioral: Negative  All other systems reviewed and are negative  Physical Exam  Physical Exam   Constitutional: He is oriented to person, place, and time  He appears well-developed and well-nourished  No distress  HENT:   Head: Normocephalic and atraumatic  Nose: Nose normal    Mouth/Throat: Oropharynx is clear and moist    Eyes: Conjunctivae and EOM are normal  Pupils are equal, round, and reactive to light  Neck: Normal range of motion  Neck supple  Cardiovascular: Normal rate, regular rhythm and normal heart sounds  No murmur heard  Pulmonary/Chest: Effort normal and breath sounds normal  No respiratory distress  He has no wheezes  He has no rales  Abdominal: Soft  Bowel sounds are normal  He exhibits no distension  There is no tenderness  There is no rebound and no guarding  Musculoskeletal: Normal range of motion  He exhibits no edema, tenderness or deformity  Neurological: He is alert and oriented to person, place, and time  No cranial nerve deficit  Skin: Skin is warm and dry  No rash noted  He is not diaphoretic  No pallor  Psychiatric: He has a normal mood and affect  Vitals reviewed        Vital Signs  ED Triage Vitals [06/19/18 1314]   Temperature Pulse Respirations Blood Pressure SpO2   99 °F (37 2 °C) (!) 113 22 147/84 98 %      Temp Source Heart Rate Source Patient Position - Orthostatic VS BP Location FiO2 (%)   Oral Monitor Sitting Left arm --      Pain Score       5           Vitals:    06/21/18 2300 06/22/18 0300 06/22/18 0700 06/22/18 1100   BP: 118/72 110/65 118/67 116/67   Pulse: 85 79 58 95   Patient Position - Orthostatic VS: Lying Lying Sitting Sitting       Visual Acuity      ED Medications  Medications   ketorolac (TORADOL) injection 15 mg (15 mg Intravenous Given 6/19/18 1424)   aspirin chewable tablet 324 mg (324 mg Oral Given 6/19/18 1512)   iohexol (OMNIPAQUE) 350 MG/ML injection (MULTI-DOSE) 85 mL (85 mL Intravenous Given 6/19/18 1522)   sodium chloride 0 9 % bolus 500 mL (0 mL Intravenous Stopped 6/19/18 1732)   regadenoson (LEXISCAN) injection 0 4 mg (0 4 mg Intravenous Given 6/20/18 1108)   perflutren lipid microsphere (DEFINITY) injection (0 6 mL/min Intravenous Given 6/20/18 1536)       Diagnostic Studies  Results Reviewed     Procedure Component Value Units Date/Time    B-type natriuretic peptide [[id number]]  (Abnormal) Collected:  06/19/18 1422    Lab Status:  Final result Specimen:  Blood from Arm, Right Updated:  06/19/18 1508     NT-proBNP 159 (H) pg/mL     Troponin I [[id number]]  (Normal) Collected:  06/19/18 1422    Lab Status:  Final result Specimen:  Blood from Arm, Right Updated:  06/19/18 1450     Troponin I 0 02 ng/mL     D-dimer, quantitative [[id number]]  (Abnormal) Collected:  06/19/18 1422    Lab Status:  Final result Specimen:  Blood from Arm, Right Updated:  06/19/18 1449     D-Dimer, Quant 490 (H) ng/ml (FEU)     Comprehensive metabolic panel [[id number]]  (Abnormal) Collected:  06/19/18 1422    Lab Status:  Final result Specimen:  Blood from Arm, Right Updated:  06/19/18 1448     Sodium 139 mmol/L      Potassium 4 0 mmol/L      Chloride 102 mmol/L      CO2 30 mmol/L      Anion Gap 7 mmol/L      BUN 20 mg/dL      Creatinine 1 44 (H) mg/dL      Glucose 98 mg/dL      Calcium 9 1 mg/dL      AST 20 U/L      ALT 35 U/L      Alkaline Phosphatase 85 U/L      Total Protein 8 6 (H) g/dL      Albumin 4 1 g/dL      Total Bilirubin 0 70 mg/dL      eGFR 61 ml/min/1 73sq m     Narrative:         National Kidney Disease Education Program recommendations are as follows:  GFR calculation is accurate only with a steady state creatinine  Chronic Kidney disease less than 60 ml/min/1 73 sq  meters  Kidney failure less than 15 ml/min/1 73 sq  meters  CBC and differential [[id number]]  (Abnormal) Collected:  06/19/18 1422    Lab Status:  Final result Specimen:  Blood from Arm, Right Updated:  06/19/18 1427     WBC 12 06 (H) Thousand/uL      RBC 5 83 (H) Million/uL      Hemoglobin 16 9 g/dL      Hematocrit 52 2 (H) %      MCV 90 fL      MCH 29 0 pg      MCHC 32 4 g/dL      RDW 12 6 %      MPV 11 0 fL      Platelets 673 Thousands/uL      nRBC 0 /100 WBCs      Neutrophils Relative 74 %      Immat GRANS % 0 %      Lymphocytes Relative 14 %      Monocytes Relative 11 %      Eosinophils Relative 1 %      Basophils Relative 0 %      Neutrophils Absolute 8 82 (H) Thousands/µL      Immature Grans Absolute 0 04 Thousand/uL      Lymphocytes Absolute 1 70 Thousands/µL      Monocytes Absolute 1 30 (H) Thousand/µL      Eosinophils Absolute 0 15 Thousand/µL      Basophils Absolute 0 05 Thousands/µL                  CTA ED chest PE study   Final Result by Ebenezer Nazario MD (06/19 5948)      No acute pathology  No pulmonary embolism  Stable 7 mm right middle lobe nodule  Fatty liver  Workstation performed: [id number]         XR chest 2 views   Final Result by Becca Clancy MD (06/19 7092)      No acute cardiopulmonary disease              Workstation performed: [id number]                    Procedures  ECG 12 Lead Documentation  Date/Time: 6/19/2018 3:59 PM  Performed by: Nico Little by: Diana Dorantes     Indications / Diagnosis:  Chest pain  Patient location:  ED  Previous ECG:     Previous ECG:  Compared to current    Similarity:  Changes noted  Interpretation:     Interpretation: abnormal    Rate:     ECG rate:  101    ECG rate assessment: tachycardic    Rhythm:     Rhythm: sinus rhythm    Ectopy:     Ectopy: none    QRS:     QRS axis:  Normal  ST segments:     ST segments:  Abnormal    Depression:  V5 and V6  T waves:     T waves: normal             Phone Contacts  ED Phone Contact    ED Course  ED Course as of Jun 23 0709   Tue Jun 19, 2018   1428 WBC: (!) 12 06   1450 D-DIMER QUANTITATIVE: (!) 56   1552 WillAdmit patient for cardiac workup          HEART Risk Score      Most Recent Value   History  1 Filed at: 06/19/2018 1552   ECG  1 Filed at: 06/19/2018 1552   Age  0 Filed at: 06/19/2018 1552   Risk Factors  2 Filed at: 06/19/2018 1552   Troponin  0 Filed at: 06/19/2018 1552   Heart Score Risk Calculator   History  1 Filed at: 06/19/2018 1552   ECG  1 Filed at: 06/19/2018 1552   Age  0 Filed at: 06/19/2018 1552   Risk Factors  2 Filed at: 06/19/2018 1552   Troponin  0 Filed at: 06/19/2018 1552   HEART Score  4 Filed at: 06/19/2018 1552   HEART Score  4 Filed at: 06/19/2018 1552                            MDM  Number of Diagnoses or Management Options  Chest pain: new and requires workup  Diagnosis management comments: This is a 75-year-old male with a significant cardiac history who presents today with chest pain  Will do a cardiac workup along with D-dimer for to rule out PE since patient is a  mostly immobile  Will give aspirin since patient does have some EKG changes which are new from prior  Patient does have history of LVH on EKG but does have some depressions laterally  Will give full aspirin          Amount and/or Complexity of Data Reviewed  Clinical lab tests: ordered and reviewed  Tests in the radiology section of CPT®: ordered and reviewed  Tests in the medicine section of CPT®: ordered and reviewed    Risk of Complications, Morbidity, and/or Mortality  Presenting problems: moderate  Diagnostic procedures: moderate  Management options: moderate    Patient Progress  Patient progress: stable    CritCare Time    Disposition  Final diagnoses:   Chest pain     Time reflects when diagnosis was documented in both MDM as applicable and the Disposition within this note     Time User Action Codes Description Comment    6/19/2018  3:55 PM Remedios Azar Add [R07 9] Chest pain     6/19/2018  6:34 PM Roselyn Vanessa Add [R94 31] Abnormal EKG     6/21/2018  2:50 PM María Chase Add [I42 0] Cardiomyopathy, dilated, nonischemic [facility])       ED Disposition     ED Disposition Condition Comment    Admit  Case was discussed with slim and the patient's admission status was agreed to be Admission Status: observation status to the service of Dr Justin Martinez   Follow-up Information     Follow up With Specialties Details Why Contact Info    Sonja Dick MD Family Medicine Call in 1 week(s)  [address]  Cardiology Call pt already has an appointment [place] [phone] [facility]  [phone]            Discharge Medication List as of 6/22/2018 11:56 AM      START taking these medications    Details   aspirin (ECOTRIN LOW STRENGTH) 81 mg EC tablet Take 1 tablet (81 mg total) by mouth daily, Starting Fri 6/22/2018, No Print         CONTINUE these medications which have CHANGED    Details   furosemide (LASIX) 40 mg tablet Take 1 tablet (40 mg total) by mouth 2 (two) times a day, Starting Thu 6/21/2018, Print      lisinopril (ZESTRIL) 5 mg tablet Take 1 tablet (5 mg total) by mouth daily, Starting Fri 6/22/2018, Print         CONTINUE these medications which have NOT CHANGED    Details   metoprolol succinate (TOPROL-XL) 100 mg 24 hr tablet Take 1 5 tablets (150 mg total) by mouth daily, Starting Fri 4/6/2018, Until Sat 4/6/2019, Normal      spironolactone (ALDACTONE) 25 mg tablet Take 1 tablet (25 mg total) by mouth daily, Starting Fri 5/11/2018, Normal           No discharge procedures on file      ED Provider  Electronically Signed by           Clifford Asencio MD  06/23/18 5855

## 2018-06-19 NOTE — PLAN OF CARE
CARDIOVASCULAR - ADULT     Maintains optimal cardiac output and hemodynamic stability Progressing     Absence of cardiac dysrhythmias or at baseline rhythm Progressing        DISCHARGE PLANNING     Discharge to home or other facility with appropriate resources Progressing        Knowledge Deficit     Patient/family/caregiver demonstrates understanding of disease process, treatment plan, medications, and discharge instructions Progressing        PAIN - ADULT     Verbalizes/displays adequate comfort level or baseline comfort level Progressing        SAFETY ADULT     Maintain or return to baseline ADL function Progressing     Maintain or return mobility status to optimal level Progressing

## 2018-06-20 ENCOUNTER — APPOINTMENT (OUTPATIENT)
Dept: NUCLEAR MEDICINE | Facility: HOSPITAL | Age: 38
End: 2018-06-20
Payer: COMMERCIAL

## 2018-06-20 ENCOUNTER — APPOINTMENT (OUTPATIENT)
Dept: NON INVASIVE DIAGNOSTICS | Facility: HOSPITAL | Age: 38
End: 2018-06-20
Payer: COMMERCIAL

## 2018-06-20 LAB
ANION GAP SERPL CALCULATED.3IONS-SCNC: 13 MMOL/L (ref 4–13)
ARRHY DURING EX: NORMAL
ATRIAL RATE: 79 BPM
BUN SERPL-MCNC: 21 MG/DL (ref 5–25)
CALCIUM SERPL-MCNC: 8.8 MG/DL (ref 8.3–10.1)
CHEST PAIN STATEMENT: NORMAL
CHLORIDE SERPL-SCNC: 102 MMOL/L (ref 100–108)
CHOLEST SERPL-MCNC: 186 MG/DL (ref 50–200)
CO2 SERPL-SCNC: 23 MMOL/L (ref 21–32)
CREAT SERPL-MCNC: 1.3 MG/DL (ref 0.6–1.3)
ERYTHROCYTE [DISTWIDTH] IN BLOOD BY AUTOMATED COUNT: 12.7 % (ref 11.6–15.1)
EST. AVERAGE GLUCOSE BLD GHB EST-MCNC: 151 MG/DL
GFR SERPL CREATININE-BSD FRML MDRD: 69 ML/MIN/1.73SQ M
GLUCOSE P FAST SERPL-MCNC: 130 MG/DL (ref 65–99)
GLUCOSE SERPL-MCNC: 102 MG/DL (ref 65–140)
GLUCOSE SERPL-MCNC: 114 MG/DL (ref 65–140)
GLUCOSE SERPL-MCNC: 122 MG/DL (ref 65–140)
GLUCOSE SERPL-MCNC: 124 MG/DL (ref 65–140)
GLUCOSE SERPL-MCNC: 130 MG/DL (ref 65–140)
HBA1C MFR BLD: 6.9 % (ref 4.2–6.3)
HCT VFR BLD AUTO: 49.5 % (ref 36.5–49.3)
HDLC SERPL-MCNC: 32 MG/DL (ref 40–60)
HGB BLD-MCNC: 15.9 G/DL (ref 12–17)
LDLC SERPL CALC-MCNC: 138 MG/DL (ref 0–100)
MAX DIASTOLIC BP: 65 MMHG
MAX HEART RATE: 106 BPM
MAX PREDICTED HEART RATE: 181 BPM
MAX. SYSTOLIC BP: 140 MMHG
MCH RBC QN AUTO: 28.8 PG (ref 26.8–34.3)
MCHC RBC AUTO-ENTMCNC: 32.1 G/DL (ref 31.4–37.4)
MCV RBC AUTO: 90 FL (ref 82–98)
NONHDLC SERPL-MCNC: 154 MG/DL
P AXIS: 50 DEGREES
PLATELET # BLD AUTO: 209 THOUSANDS/UL (ref 149–390)
PMV BLD AUTO: 11 FL (ref 8.9–12.7)
POTASSIUM SERPL-SCNC: 3.9 MMOL/L (ref 3.5–5.3)
PR INTERVAL: 148 MS
PROTOCOL NAME: NORMAL
QRS AXIS: -15 DEGREES
QRSD INTERVAL: 100 MS
QT INTERVAL: 402 MS
QTC INTERVAL: 460 MS
RBC # BLD AUTO: 5.53 MILLION/UL (ref 3.88–5.62)
REASON FOR TERMINATION: NORMAL
SODIUM SERPL-SCNC: 138 MMOL/L (ref 136–145)
T WAVE AXIS: 171 DEGREES
TARGET HR FORMULA: NORMAL
TEST INDICATION: NORMAL
TIME IN EXERCISE PHASE: NORMAL
TRIGL SERPL-MCNC: 78 MG/DL
TROPONIN I SERPL-MCNC: 0.03 NG/ML
TROPONIN I SERPL-MCNC: 0.04 NG/ML
VENTRICULAR RATE: 79 BPM
WBC # BLD AUTO: 13.11 THOUSAND/UL (ref 4.31–10.16)

## 2018-06-20 PROCEDURE — 78452 HT MUSCLE IMAGE SPECT MULT: CPT

## 2018-06-20 PROCEDURE — 82948 REAGENT STRIP/BLOOD GLUCOSE: CPT

## 2018-06-20 PROCEDURE — 93017 CV STRESS TEST TRACING ONLY: CPT

## 2018-06-20 PROCEDURE — A9502 TC99M TETROFOSMIN: HCPCS

## 2018-06-20 PROCEDURE — 93306 TTE W/DOPPLER COMPLETE: CPT

## 2018-06-20 PROCEDURE — 85027 COMPLETE CBC AUTOMATED: CPT | Performed by: INTERNAL MEDICINE

## 2018-06-20 PROCEDURE — 99204 OFFICE O/P NEW MOD 45 MIN: CPT | Performed by: INTERNAL MEDICINE

## 2018-06-20 PROCEDURE — 93306 TTE W/DOPPLER COMPLETE: CPT | Performed by: INTERNAL MEDICINE

## 2018-06-20 PROCEDURE — 99226 PR SBSQ OBSERVATION CARE/DAY 35 MINUTES: CPT | Performed by: INTERNAL MEDICINE

## 2018-06-20 PROCEDURE — 83036 HEMOGLOBIN GLYCOSYLATED A1C: CPT | Performed by: INTERNAL MEDICINE

## 2018-06-20 PROCEDURE — 84484 ASSAY OF TROPONIN QUANT: CPT | Performed by: INTERNAL MEDICINE

## 2018-06-20 PROCEDURE — 80048 BASIC METABOLIC PNL TOTAL CA: CPT | Performed by: INTERNAL MEDICINE

## 2018-06-20 PROCEDURE — 93010 ELECTROCARDIOGRAM REPORT: CPT | Performed by: INTERNAL MEDICINE

## 2018-06-20 PROCEDURE — 93005 ELECTROCARDIOGRAM TRACING: CPT

## 2018-06-20 PROCEDURE — 80061 LIPID PANEL: CPT | Performed by: INTERNAL MEDICINE

## 2018-06-20 RX ORDER — HEPARIN SODIUM 5000 [USP'U]/ML
5000 INJECTION, SOLUTION INTRAVENOUS; SUBCUTANEOUS EVERY 8 HOURS SCHEDULED
Status: DISCONTINUED | OUTPATIENT
Start: 2018-06-20 | End: 2018-06-22 | Stop reason: HOSPADM

## 2018-06-20 RX ORDER — LISINOPRIL 2.5 MG/1
2.5 TABLET ORAL DAILY
Status: DISCONTINUED | OUTPATIENT
Start: 2018-06-20 | End: 2018-06-21

## 2018-06-20 RX ADMIN — REGADENOSON 0.4 MG: 0.08 INJECTION, SOLUTION INTRAVENOUS at 11:08

## 2018-06-20 RX ADMIN — PERFLUTREN 0.6 ML/MIN: 6.52 INJECTION, SUSPENSION INTRAVENOUS at 15:36

## 2018-06-20 RX ADMIN — METOPROLOL SUCCINATE 150 MG: 100 TABLET, EXTENDED RELEASE ORAL at 09:58

## 2018-06-20 RX ADMIN — FUROSEMIDE 30 MG: 20 TABLET ORAL at 12:56

## 2018-06-20 RX ADMIN — HEPARIN SODIUM 5000 UNITS: 5000 INJECTION, SOLUTION INTRAVENOUS; SUBCUTANEOUS at 21:21

## 2018-06-20 RX ADMIN — ASPIRIN 81 MG: 81 TABLET, COATED ORAL at 09:58

## 2018-06-20 RX ADMIN — LISINOPRIL 2.5 MG: 2.5 TABLET ORAL at 13:01

## 2018-06-20 RX ADMIN — FUROSEMIDE 30 MG: 20 TABLET ORAL at 17:19

## 2018-06-20 RX ADMIN — HEPARIN SODIUM 5000 UNITS: 5000 INJECTION, SOLUTION INTRAVENOUS; SUBCUTANEOUS at 15:54

## 2018-06-20 NOTE — CONSULTS
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, HTN, DM presenting with chest pain since yesterday  Chest pain described as mild chest tightness in the middle of his chest, nonradiating, lasting about 2 hours, accompanied by mild SOB  Patient states that symptoms were preceded by a flushing sensation as he tried to get into his car  No prior history of CAD  No family history of CAD  Nonsmoker  Has been noncompliant with stress test and ECHO in the past   Cardiac catheterization in 2015 does not reveal any evidence of CAD  REVIEW OF SYSTEMS:  Constitutional:  Denies fever or chills   Eyes:  Denies change in visual acuity   HENT:  Denies nasal congestion or sore throat   Respiratory:  +SOB  Cardiovascular:  +chest pain  GI:  Denies abdominal pain, nausea, vomiting, bloody stools or diarrhea   :  Denies dysuria, frequency, difficulty in micturition and nocturia  Musculoskeletal:  Denies back pain or joint pain   Neurologic:  Denies headache, focal weakness or sensory changes   Endocrine:  Denies polyuria or polydipsia   Lymphatic:  Denies swollen glands   Psychiatric:  Denies depression or anxiety     Historical Information   Past Medical History:   Diagnosis Date    CHF (congestive heart failure) (HCC)      Past Surgical History:   Procedure Laterality Date    CARDIAC CATHETERIZATION       History   Alcohol Use No     History   Drug Use No     History   Smoking Status    Former Smoker   Smokeless Tobacco    Never Used     Family History: History reviewed  No pertinent family history  MEDS & ALLERGIES:  all current active meds have been reviewed and current meds:   Current Facility-Administered Medications   Medication Dose Route Frequency    acetaminophen (TYLENOL) tablet 650 mg  650 mg Oral Q6H PRN    aspirin (ECOTRIN LOW STRENGTH) EC tablet 81 mg  81 mg Oral Daily    furosemide (LASIX) tablet 30 mg  30 mg Oral BID    insulin lispro (HumaLOG) 100 units/mL subcutaneous injection 1-5 Units  1-5 Units Subcutaneous HS    insulin lispro (HumaLOG) 100 units/mL subcutaneous injection 2-12 Units  2-12 Units Subcutaneous TID AC    metoprolol succinate (TOPROL-XL) 24 hr tablet 150 mg  150 mg Oral Daily    nitroglycerin (NITROSTAT) SL tablet 0 4 mg  0 4 mg Sublingual Q5 Min PRN        Allergies   Allergen Reactions    Prednisone Shortness Of Breath and Edema       OBJECTIVE:  Vitals:   Vitals:    06/20/18 0700   BP: 127/68   Pulse: 87   Resp: 18   Temp: 98 5 °F (36 9 °C)   SpO2: 96%     Body mass index is 35 87 kg/m²  Systolic (96DXA), OJL:816 , Min:115 , LXX:186     Diastolic (59WTI), RVE:03, Min:55, Max:84      Intake/Output Summary (Last 24 hours) at 06/20/18 1025  Last data filed at 06/20/18 0900   Gross per 24 hour   Intake             1600 ml   Output             1775 ml   Net             -175 ml     Weight (last 2 days)     Date/Time   Weight    06/19/18 1314  113 (250)            Invasive Devices     Peripheral Intravenous Line            Peripheral IV 06/19/18 Right Antecubital less than 1 day                PHYSICAL EXAMS:  General:  Patient is not in acute distress, laying in the bed comfortably, awake, alert responding to commands  Head: Normocephalic, Atraumatic  HEENT:  Both pupils normal-size atraumatic, normocephalic, nonicteric  Neck:  JVP not raised  Trachea central  Respiratory:  Bronchovascular breathing all over the chest without any accompaniment  Cardiovascular:  S1-S2 normal without any murmur rails or rub  GI:  Abdomen soft nontender  Liver and spleen normal size  Musculoskeletal:  No edema  Integument:  No skin rashes or ulceration  Lymphatic:  No cervical or inguinal lymphadenopathy  Neurologic:  Patient is awake alert, responding to command, well-oriented to time and place and person    LABORATORY RESULTS:    Results from last 7 days  Lab Units 06/20/18  0444 06/20/18  0141 06/19/18  2226   TROPONIN I ng/mL 0 03 0 04 0 05*     CBC with diff:   Results from last 7 days  Lab Units 06/20/18  0444 06/19/18  1422   WBC Thousand/uL 13 11* 12 06*   HEMOGLOBIN g/dL 15 9 16 9   HEMATOCRIT % 49 5* 52 2*   MCV fL 90 90   PLATELETS Thousands/uL 209 225   MCH pg 28 8 29 0   MCHC g/dL 32 1 32 4   RDW % 12 7 12 6   MPV fL 11 0 11 0   NRBC AUTO /100 WBCs  --  0       CMP:  Results from last 7 days  Lab Units 06/20/18  0444 06/19/18  1422   SODIUM mmol/L 138 139   POTASSIUM mmol/L 3 9 4 0   CHLORIDE mmol/L 102 102   CO2 mmol/L 23 30   ANION GAP mmol/L 13 7   BUN mg/dL 21 20   CREATININE mg/dL 1 30 1 44*   GLUCOSE RANDOM mg/dL 130 98   CALCIUM mg/dL 8 8 9 1   AST U/L  --  20   ALT U/L  --  35   ALK PHOS U/L  --  85   TOTAL PROTEIN g/dL  --  8 6*   BILIRUBIN TOTAL mg/dL  --  0 70   EGFR ml/min/1 73sq m 69 61       BMP:  Results from last 7 days  Lab Units 06/20/18  0444 06/19/18  1422   SODIUM mmol/L 138 139   POTASSIUM mmol/L 3 9 4 0   CHLORIDE mmol/L 102 102   CO2 mmol/L 23 30   BUN mg/dL 21 20   CREATININE mg/dL 1 30 1 44*   GLUCOSE RANDOM mg/dL 130 98   CALCIUM mg/dL 8 8 9 1         Results from last 7 days  Lab Units 06/19/18  1422   NT-PRO BNP pg/mL 159*            Results from last 7 days  Lab Units 06/20/18  0444   HEMOGLOBIN A1C % 6 9*               Lipid Profile:   Lab Results   Component Value Date    CHOL 186 06/20/2018    CHOL 189 03/25/2017    CHOL 121 06/17/2015     Lab Results   Component Value Date    HDL 32 (L) 06/20/2018    HDL 32 (L) 03/25/2017    HDL 30 06/17/2015     Lab Results   Component Value Date    LDLCALC 138 (H) 06/20/2018 LDLCALC 118 (H) 2017    LDLCALC 72 2015     Lab Results   Component Value Date    TRIG 78 2018    TRIG 197 (H) 2017    TRIG 95 2015       Cardiac testing:   Results for orders placed during the hospital encounter of 16   Echo complete with contrast if indicated    [address]  [phone]    Transthoracic Echocardiogram  2D, M-mode, Doppler, and Color Doppler    Study date:  2016    Patient: Gomez Wiley  MR number: [id number]  Account number: [de-identified]  : 1980  Age: 38 years  Gender: Male  Status: Outpatient  Location: [facility]  Height: 70 in  Weight: 254 lb  BP: 140/ 76 mmHg    Indications: Assess left ventricular function  Diagnoses: I42 9 - Cardiomyopathy, unspecified    Sonographer:  DEYSI Richard  Primary Physician:  Maribel Evans PA-C  Referring Physician:  Brain Dee DO  Group:  [facility]  Interpreting Physician:  Ade Magana MD    SUMMARY    LEFT VENTRICLE:  Systolic function was mildly reduced  Ejection fraction was estimated to be 40  % - 45 %  There was mild diffuse hypokinesis  Wall thickness was increased  There was moderate concentric hypertrophy  Left ventricular diastolic function parameters were normal     VENTRICULAR SEPTUM:  There was dyssynergic motion  MITRAL VALVE:  There was trace regurgitation  COMPARISONS:  The previous study was not available for direct comparison, however, there have  been changes from the report of that study  Comparison was made with the  previous study of 08-Oct-2015  LV function most likely similar to prior study  However, degree of mitral regurgitation appears to have decreased  HISTORY: PRIOR HISTORY: Risk factors: hypertension, morbid obesity, and a  former history of cigarette use (quitting more than one month ago)      PROCEDURE: The study was performed in the [address] [place] [address]  This  was a routine study  The transthoracic approach was used  The study included  complete 2D imaging, M-mode, complete spectral Doppler, and color Doppler  The  heart rate was 99 bpm, at the start of the study  Image quality was good  LEFT VENTRICLE: Size was normal  Systolic function was mildly reduced  Ejection  fraction was estimated to be 40 % - 45 %  There was mild diffuse hypokinesis  Wall thickness was increased  There was moderate concentric hypertrophy  DOPPLER: The ratio of early ventricular filling to atrial contraction  velocities was within the normal range  Left ventricular diastolic function  parameters were normal     VENTRICULAR SEPTUM: There was dyssynergic motion  RIGHT VENTRICLE: The size was normal  Systolic function was normal     LEFT ATRIUM: Size was normal     RIGHT ATRIUM: Size was normal     MITRAL VALVE: Valve structure was normal  There was normal leaflet separation  DOPPLER: There was no evidence for stenosis  There was trace regurgitation  AORTIC VALVE: The valve was not well visualized  DOPPLER: There was no evidence  for stenosis  There was no significant regurgitation  TRICUSPID VALVE: The valve structure was normal  There was normal leaflet  separation  DOPPLER: The transtricuspid velocity was within the normal range  There was no evidence for stenosis  There was no regurgitation  PULMONIC VALVE: Not well visualized  DOPPLER: The transpulmonic velocity was  within the normal range  There was no evidence for stenosis  There was no  significant regurgitation  PERICARDIUM: There was no pericardial effusion  The pericardium was normal in  appearance  AORTA: The root exhibited normal size      SYSTEM MEASUREMENT TABLES    2D  %FS: 21 3 %  Ao Diam: 3 47 cm  EF(Teich): 42 72 %  IVSd: 1 42 cm  LA Diam: 4 15 cm  LVIDd: 5 6 cm  LVIDs: 4 41 cm  LVPWd: 1 27 cm    PW  E/E': 7 79  MV E/A Ratio: 1 08    [employer] Accredited Echocardiography Laboratory    Prepared and electronically signed by    Bo Mckeon MD  Signed 08-Apr-2016 08:16:55         Imaging: I have personally reviewed pertinent reports  EKG reviewed personally:   NSR, ST segment and T-wave abnormalities in lateral leads    Assessment/Plan:  1  Chest pain:  -serial troponin 0 02, 0 05, 0 04, 0 03   -EKG does show ST segment and T-wave abnormalities in lateral leads  -CXR unremarkable  CTA chest shows stable 7 mm right middle lobe nodule, fatty liver   -echo ordered, will assess EF and regional wall motion abnormalities  -stress test ordered, will rule out coronary ischemia  -continue aspirin, Toprol    2  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:  -  Euvolemic on exam   Does not appear to be in acute exacerbation   -last EF in 2016 was 40-45%  Will reassess during echo this admission   -continue Toprol  Will start low-dose lisinopril   -low-salt diet, daily weights  3   Hypertension:  Stable, continue present regimen    Code Status: Level 1 - Full Code    Thank you for allowing us to participate in this patient's care  This pt will follow up with Dr Analisa Stewart once discharged  Counseling / Coordination of Care  Total floor / unit time spent today 35 minutes  Greater than 50% of total time was spent with the patient and / or family counseling and / or coordination of care  A description of the counseling / coordination of care: Review of history, current assessment, development of a plan  Meli Bahena PA-C  6/20/2018,10:25 AM    Portions of the record may have been created with voice recognition software   Occasional wrong word or "sound a like" substitutions may have occurred due to the inherent limitations of voice recognition software   Read the chart carefully and recognize, using context, where substitutions have occurred

## 2018-06-20 NOTE — PROGRESS NOTES
[facility] Internal Medicine Progress Note  Patient: Melisa Lucas 42 y o  male   MRN: [id number]  PCP: Nelsy Muñoz MD  Unit/Bed#: -[place] Encounter: [id number]  Date Of Visit: 06/20/18    Assessment/Plan:    Principal Problem:    Chest pain  Active Problems:    Leukocytosis    Cardiomyopathy, dilated, nonischemic (HCC)    Incidental pulmonary nodule, > 3mm and < 8mm    Fatty liver disease, nonalcoholic    Chronic systolic heart failure ([address] )    Present on Admission:   Chest pain   Chronic systolic heart failure (HCC)   Leukocytosis   Cardiomyopathy, dilated, nonischemic ([address] )   Incidental pulmonary nodule, > 3mm and < 8mm   Fatty liver disease, nonalcoholic      ·  Chest pain-atypical   Patient had a stress test which showed significantly low ejection fraction as compared to the previous echocardiogram he had in 2016  He really is not compliant with his follow-up and did not have his echocardiogram done which was ordered last year  Waiting for echocardiogram results  If he has significantly low echocardiogram, he would need life vest to be arranged prior to discharge  · Cardiomyopathy  History of nonischemic cardiomyopathy  Continue with medical management  May have to titrate his medications up, however, he also needs close follow up with his cardiologist  · Dyslipidemia  Also has fatty liver because of that  Encouraged on healthy diet and exercise to lose some weight  · Obesity-BMI above 35  Encouraged again on healthy diet and exercise  · Leukocytosis-likely reactive  Continue to monitor  · Chronic kidney disease stage 2  Chronically on diuretics/ACE-inhibitor  Continue to monitor labs      VTE Pharmacologic Prophylaxis:   Pharmacologic: Heparin  Mechanical VTE Prophylaxis in Place: Yes    Patient Centered Rounds: I have performed bedside rounds with nursing staff today      Discussions with Specialists or Other Care Team Provider:  Yes    Education and Discussions with Family / Patient:  Yes    Time Spent for Care: 35+ minutes  More than 50% of total time spent on counseling and coordination of care as described above  Current Length of Stay: 0 day(s)    Current Patient Status: Observation   Certification Statement: The patient will continue to require additional inpatient hospital stay due to Worsening ejection fraction/cardiomyopathy    Discharge Plan:  Depending upon the results of echocardiogram-home when stable    Code Status: Level 1 - Full Code      Subjective:   Feels okay now  Presented with chest pain and also shortness of breath  He denies any fever or chills  Denies any nausea or vomiting  Denies any abdominal pain    Objective:     Vitals:   Temp (24hrs), Av 2 °F (36 8 °C), Min:97 9 °F (36 6 °C), Max:98 5 °F (36 9 °C)    HR:  [77-91] 87  Resp:  [16-19] 18  BP: (115-127)/(55-84) 123/84  SpO2:  [94 %-98 %] 96 %  Body mass index is 35 87 kg/m²  Input and Output Summary (last 24 hours): Intake/Output Summary (Last 24 hours) at 18 1427  Last data filed at 18 0900   Gross per 24 hour   Intake             1600 ml   Output             1775 ml   Net             -175 ml           Physical Exam:     Vital signs are reviewed as above  Constitutional:  Patient sitting in bed  Not in any respiratory   Eyes: EOM grossly intact  Conjunctivae slightly pale  Patient has anicteric sclera  HENT: Oropharynx are moist  Did not notice any significant lesions on the tongue  Head normocephalic  Neck: Neck is supple  I was not able to visualize any JVD at 90°  There is no significant lymphadenopathy  I also did not notice any significant thyromegaly  Cardiac: I did not hear any rubs or gallop  Patient appears to be in sinus rhythm  Respiratory: Patient not in significant respiratory distress  Air entry in general is fair with decreased breath sounds at the bases  GI: Abdomen is obese and soft  It is grossly nontender  Bowel sounds are adequate   I was not able to appreciate any hepatosplenomegaly  Neurologic:  Patient is awake and alert  Neurological examination is grossly intact  No obvious focal neurological deficit noticed  Skin: Skin is warm and dry  Psychiatric: Mood and affect are pleasant  Musculoskeletal  Patient moving all extremities while in bed  Extremities: Patient has mild bilateral lower extremities edema       Additional Data:     Labs:      Results from last 7 days  Lab Units 06/20/18  0444 06/19/18  1422   WBC Thousand/uL 13 11* 12 06*   HEMOGLOBIN g/dL 15 9 16 9   HEMATOCRIT % 49 5* 52 2*   PLATELETS Thousands/uL 209 225   NEUTROS PCT %  --  74   LYMPHS PCT %  --  14   MONOS PCT %  --  11   EOS PCT %  --  1       Results from last 7 days  Lab Units 06/20/18  0444 06/19/18  1422   SODIUM mmol/L 138 139   POTASSIUM mmol/L 3 9 4 0   CHLORIDE mmol/L 102 102   CO2 mmol/L 23 30   BUN mg/dL 21 20   CREATININE mg/dL 1 30 1 44*   CALCIUM mg/dL 8 8 9 1   TOTAL PROTEIN g/dL  --  8 6*   BILIRUBIN TOTAL mg/dL  --  0 70   ALK PHOS U/L  --  85   ALT U/L  --  35   AST U/L  --  20   GLUCOSE RANDOM mg/dL 130 98           * I Have Reviewed All Lab Data Listed Above  * Additional Pertinent Lab Tests Reviewed:  All Labs Within Last 24 Hours Reviewed      Recent Cultures (last 7 days):           Last 24 Hours Medication List:     Current Facility-Administered Medications:  acetaminophen 650 mg Oral Q6H PRN Moreno Sinks, DO   aspirin 81 mg Oral Daily Moreno Sinks, DO   furosemide 30 mg Oral BID Moreno Sinks, DO   insulin lispro 1-5 Units Subcutaneous HS Moreno Sinks, DO   insulin lispro 2-12 Units Subcutaneous TID AC Moreno Sinks, DO   lisinopril 2 5 mg Oral Daily Zayra Armando PANATHAN   metoprolol succinate 150 mg Oral Daily Moreno Sinks, DO   nitroglycerin 0 4 mg Sublingual Q5 Min PRN Moreno Sinks, DO        Today, Patient Was Seen By: Anyi Langford MD    ** Please Note: Dragon 360 Dictation voice to text software may have been used in the creation of this document   **

## 2018-06-20 NOTE — CASE MANAGEMENT
Initial Clinical Review    Admission: Date/Time/Statement: 06/19/18 @ 1556 Observation Written     Orders Placed This Encounter   Procedures    Place in Observation (expected length of stay for this patient is less than two midnights)     Standing Status:   Standing     Number of Occurrences:   1     Order Specific Question:   Admitting Physician     Answer:   Vu Glover [[id number]]     Order Specific Question:   Level of Care     Answer:   Med Surg [16]         ED: Date/Time/Mode of Arrival:   ED Arrival Information     Expected Arrival Acuity Means of Arrival Escorted By Service Admission Type    - 6/19/2018 13:08 Emergent Walk-In Spouse General Medicine Emergency    Arrival Complaint    CHEST PAIN          Chief Complaint:   Chief Complaint   Patient presents with    Chest Pain     Patient presents with CP that began suddenly about 1 hour ago  Describes the pain as a tight pressure  History of Illness: Haseeb Alvarado is a 42 y o  male medical history significant for 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, hypertension, type 2 diabetes presents with chest pain  Patient states he was in his usual state of health until this afternoon  He had gotten out of his his truck into his car , he states that due to the ambient weather he felt extremely hot  Subsequently had chest pain/tightness in midsternum, no radiation associated with mild shortness of breath  States that his symptoms persisted until he presented to the ED  At this point he says his symptoms have essentially resolved       He had been admitted in 2017 for chest pain plan was to proceed with stress test however patient signed out against medical advice  Due to his history of heart failure follows up with Dr Marcus Swift but he admits that he has not seen him in over a year  He has also been ordered for an echocardiogram which he has been noncompliant with as well      ED Vital Signs:   ED Triage Vitals [06/19/18 1314] Temperature Pulse Respirations Blood Pressure SpO2   99 °F (37 2 °C) (!) 113 22 147/84 98 %      Temp Source Heart Rate Source Patient Position - Orthostatic VS BP Location FiO2 (%)   Oral Monitor Sitting Left arm --      Pain Score       5        Wt Readings from Last 1 Encounters:   06/19/18 113 kg (250 lb)       Vital Signs (abnormal): WNL    Abnormal Labs/Diagnostic Test Results:   WBC 12 06*   HEMATOCRIT 52 2*     CREATININE 1 44*   TOTAL PROTEIN 8 6*     D-Dimer, Quant 490       NT-proBNP 159       CXR:  NAD  CTA Chest PE study:  No acute pathology  No pulmonary embolism  Stable 7 mm right middle lobe nodule  Fatty liver  EKG:  ST, rate 101    ED Treatment:   Medication Administration from 06/19/2018 1308 to 06/19/2018 1813       Date/Time Order Dose Route Action     06/19/2018 1424 ketorolac (TORADOL) injection 15 mg 15 mg Intravenous Given     06/19/2018 1512 aspirin chewable tablet 324 mg 324 mg Oral Given     06/19/2018 1522 iohexol (OMNIPAQUE) 350 MG/ML injection (MULTI-DOSE) 85 mL 85 mL Intravenous Given     06/19/2018 1656 sodium chloride 0 9 % bolus 500 mL 500 mL Intravenous New Bag          Past Medical/Surgical History:    Active Ambulatory Problems     Diagnosis Date Noted    Exertional chest pain 03/24/2017    Elevated troponin 03/24/2017    Hyperglycemia 03/24/2017    Leukocytosis 03/24/2017    Cardiomyopathy, dilated, nonischemic (HCC) 03/25/2017    Chronic combined systolic and diastolic congestive heart failure ([address] ) 03/25/2017    Angina of effort ([facility] [address] ) 03/25/2017    Incidental pulmonary nodule, > 3mm and < 8mm 03/25/2017    Fatty liver disease, nonalcoholic 47/01/8993     Resolved Ambulatory Problems     Diagnosis Date Noted    No Resolved Ambulatory Problems     Past Medical History:   Diagnosis Date    CHF (congestive heart failure) (HCC)        Admitting Diagnosis: Chest pain [R07 9]    Age/Sex: 42 y o  male    Assessment/Plan:   Principal Problem:    Chest pain  Active Problems:    Chronic systolic heart failure (HCC)        Plan for the Primary Problem(s):     # CP - sounds atypical however significant risk factors and given EKG changes will admit to r/o for acs  - consult cardiology  - cardiac cath in 2015 no evidence of disease  - Obtain echocardiogram, NM stress  - Serial troponin  - prn nitro  - ASA daily  - Further risk stratify pt thus will obtain lipid profile  - cont telemetry monitoring  - Ruled out for PE     Plan for Additional Problems:   # chronic systolic HF, ef of 78-45 %, NICM s/p cardiac cath   - compensated  - Most recent echo in 2015, is actually due to have one since 2017 but has been non compliant will obtain now  - cont lasix     # CKD stg III - though slight cr bump from last reading which was in 3/2017 still w/in baseline range  - will hold lisinopril/aldactone for now as bp on low side as well; but if cr remains stable would rec to cont     # Leukocytosis - chronic, no sign of acute infectious process; further wkup as outpatient     # DM II, - diet controlled, last HgA1c dof 7 2 (3/2017)   - ISS  - Check hgA1c     # HTN - stable     # Pulmonary nodule RML, known hx - from last admission, size remains the same from prior imaging  - serial imaging yearly     VTE Prophylaxis: Low risk  / sequential compression device   Code Status:  Full code  POLST: There is no POLST form on file for this patient (pre-hospital)     Anticipated Length of Stay:  Patient will be admitted on an Observation basis with an anticipated length of stay of  less than 2 midnights     Justification for Hospital Stay:  Rule out ACS    Admission Orders:  Telemetry, trop H5L  Cardiac, stress test meal  Accuchecks QAC and QHS with coverage  I/O  Consult cardiology  Echo  EKG    Scheduled Meds:   Current Facility-Administered Medications:  acetaminophen 650 mg Oral Q6H PRN   aspirin 81 mg Oral Daily   furosemide 30 mg Oral BID   insulin lispro 1-5 Units Subcutaneous HS   insulin lispro 2-12 Units Subcutaneous TID AC   metoprolol succinate 150 mg Oral Daily   nitroglycerin 0 4 mg Sublingual Q5 Min PRN     Continuous Infusions:    PRN Meds:   acetaminophen    nitroglycerin

## 2018-06-21 PROBLEM — E66.9 DIABETES MELLITUS TYPE 2 IN OBESE (HCC): Status: ACTIVE | Noted: 2018-06-21

## 2018-06-21 PROBLEM — E66.09 CLASS 2 OBESITY DUE TO EXCESS CALORIES IN ADULT: Status: ACTIVE | Noted: 2018-06-21

## 2018-06-21 PROBLEM — E11.69 DIABETES MELLITUS TYPE 2 IN OBESE (HCC): Status: ACTIVE | Noted: 2018-06-21

## 2018-06-21 LAB
ANION GAP SERPL CALCULATED.3IONS-SCNC: 5 MMOL/L (ref 4–13)
BUN SERPL-MCNC: 20 MG/DL (ref 5–25)
CALCIUM SERPL-MCNC: 8.5 MG/DL (ref 8.3–10.1)
CHLORIDE SERPL-SCNC: 103 MMOL/L (ref 100–108)
CO2 SERPL-SCNC: 27 MMOL/L (ref 21–32)
CREAT SERPL-MCNC: 1.32 MG/DL (ref 0.6–1.3)
GFR SERPL CREATININE-BSD FRML MDRD: 67 ML/MIN/1.73SQ M
GLUCOSE SERPL-MCNC: 102 MG/DL (ref 65–140)
GLUCOSE SERPL-MCNC: 160 MG/DL (ref 65–140)
GLUCOSE SERPL-MCNC: 63 MG/DL (ref 65–140)
GLUCOSE SERPL-MCNC: 89 MG/DL (ref 65–140)
GLUCOSE SERPL-MCNC: 89 MG/DL (ref 65–140)
POTASSIUM SERPL-SCNC: 3.9 MMOL/L (ref 3.5–5.3)
SODIUM SERPL-SCNC: 135 MMOL/L (ref 136–145)

## 2018-06-21 PROCEDURE — 99214 OFFICE O/P EST MOD 30 MIN: CPT | Performed by: INTERNAL MEDICINE

## 2018-06-21 PROCEDURE — 99226 PR SBSQ OBSERVATION CARE/DAY 35 MINUTES: CPT | Performed by: INTERNAL MEDICINE

## 2018-06-21 PROCEDURE — 82948 REAGENT STRIP/BLOOD GLUCOSE: CPT

## 2018-06-21 PROCEDURE — 80048 BASIC METABOLIC PNL TOTAL CA: CPT | Performed by: INTERNAL MEDICINE

## 2018-06-21 RX ORDER — FUROSEMIDE 40 MG/1
80 TABLET ORAL EVERY EVENING
Status: ON HOLD | COMMUNITY
End: 2018-06-21

## 2018-06-21 RX ORDER — SPIRONOLACTONE 25 MG/1
25 TABLET ORAL DAILY
Status: DISCONTINUED | OUTPATIENT
Start: 2018-06-21 | End: 2018-06-22 | Stop reason: HOSPADM

## 2018-06-21 RX ORDER — FUROSEMIDE 40 MG/1
40 TABLET ORAL 2 TIMES DAILY
Qty: 60 TABLET | Refills: 2 | Status: SHIPPED | OUTPATIENT
Start: 2018-06-21 | End: 2018-06-21 | Stop reason: HOSPADM

## 2018-06-21 RX ORDER — LISINOPRIL 5 MG/1
5 TABLET ORAL DAILY
Qty: 30 TABLET | Refills: 2 | Status: SHIPPED | OUTPATIENT
Start: 2018-06-22 | End: 2018-06-29 | Stop reason: ALTCHOICE

## 2018-06-21 RX ORDER — ASPIRIN 81 MG/1
81 TABLET ORAL DAILY
Refills: 0
Start: 2018-06-22 | End: 2019-12-27 | Stop reason: SDUPTHER

## 2018-06-21 RX ORDER — LISINOPRIL 5 MG/1
5 TABLET ORAL DAILY
Status: DISCONTINUED | OUTPATIENT
Start: 2018-06-21 | End: 2018-06-22 | Stop reason: HOSPADM

## 2018-06-21 RX ORDER — FUROSEMIDE 40 MG/1
40 TABLET ORAL EVERY EVENING
Status: ON HOLD | COMMUNITY
Start: 2016-06-15 | End: 2018-06-21

## 2018-06-21 RX ORDER — FUROSEMIDE 40 MG/1
40 TABLET ORAL
Status: DISCONTINUED | OUTPATIENT
Start: 2018-06-21 | End: 2018-06-22 | Stop reason: HOSPADM

## 2018-06-21 RX ORDER — FUROSEMIDE 80 MG
80 TABLET ORAL
Status: DISCONTINUED | OUTPATIENT
Start: 2018-06-21 | End: 2018-06-21

## 2018-06-21 RX ORDER — FUROSEMIDE 40 MG/1
40 TABLET ORAL 2 TIMES DAILY
Qty: 60 TABLET | Refills: 2 | Status: SHIPPED | OUTPATIENT
Start: 2018-06-21 | End: 2018-12-02 | Stop reason: SDUPTHER

## 2018-06-21 RX ORDER — LANOLIN ALCOHOL/MO/W.PET/CERES
3 CREAM (GRAM) TOPICAL
Status: DISCONTINUED | OUTPATIENT
Start: 2018-06-21 | End: 2018-06-22 | Stop reason: HOSPADM

## 2018-06-21 RX ADMIN — FUROSEMIDE 30 MG: 20 TABLET ORAL at 09:43

## 2018-06-21 RX ADMIN — HEPARIN SODIUM 5000 UNITS: 5000 INJECTION, SOLUTION INTRAVENOUS; SUBCUTANEOUS at 21:45

## 2018-06-21 RX ADMIN — HEPARIN SODIUM 5000 UNITS: 5000 INJECTION, SOLUTION INTRAVENOUS; SUBCUTANEOUS at 14:08

## 2018-06-21 RX ADMIN — ASPIRIN 81 MG: 81 TABLET, COATED ORAL at 09:43

## 2018-06-21 RX ADMIN — FUROSEMIDE 40 MG: 40 TABLET ORAL at 17:14

## 2018-06-21 RX ADMIN — HEPARIN SODIUM 5000 UNITS: 5000 INJECTION, SOLUTION INTRAVENOUS; SUBCUTANEOUS at 06:22

## 2018-06-21 RX ADMIN — LISINOPRIL 5 MG: 5 TABLET ORAL at 09:42

## 2018-06-21 RX ADMIN — METOPROLOL SUCCINATE 150 MG: 100 TABLET, EXTENDED RELEASE ORAL at 09:43

## 2018-06-21 RX ADMIN — SPIRONOLACTONE 25 MG: 25 TABLET, FILM COATED ORAL at 09:42

## 2018-06-21 RX ADMIN — MELATONIN 3 MG ORAL TABLET 3 MG: 3 TABLET ORAL at 22:56

## 2018-06-21 NOTE — SOCIAL WORK
CM received script for Live Vest  [facility]  and contacted [facility] to assure receipt  Life Vest will be delivered today to pt's room  Pt has no other needs at this time  CM department to follow pt through dc process

## 2018-06-21 NOTE — NURSING NOTE
Patient is aleert and oriented  Blood glucose check is 63  Patient is assymptomatic, alert and ambulating in his room  4 ounces of orange juice offered  Will recheck blood glucose

## 2018-06-21 NOTE — PROGRESS NOTES
General Cardiology   Progress Note   Malone Cockayne 42 y o  male MRN: [id number]  Unit/Bed#: -[facility] Encounter: [id number]      SUBJECTIVE:   No significant events overnight  Denies chest pain, SOB  Will be fitted for LifeVest today  OBJECTIVE:   Vitals:  Vitals:    06/21/18 0700   BP: 121/74   Pulse: 89   Resp: 18   Temp: 98 4 °F (36 9 °C)   SpO2: 97%     Body mass index is 35 87 kg/m²  Systolic (34VCK), XVO:420 , Min:119 , XVJ:498     Diastolic (96OZN), LLE:32, Min:58, Max:84      Intake/Output Summary (Last 24 hours) at 06/21/18 0856  Last data filed at 06/21/18 0500   Gross per 24 hour   Intake             1100 ml   Output             1375 ml   Net             -275 ml     Weight (last 2 days)     Date/Time   Weight    06/19/18 1314  113 (250)                PHYSICAL EXAMS:  General:  Patient is not in acute distress, laying in the bed comfortably, awake, alert responding to commands  Head: Normocephalic, Atraumatic  HEENT:  Both pupils normal-size atraumatic, normocephalic, nonicteric  Neck:  JVP not raised  Trachea central  Respiratory:  Bronchovascular breathing all over the chest without any accompaniment  Cardiovascular:  RRR  No murmurs, rubs, gallops  GI:  Abdomen soft nontender   Liver and spleen normal size  Lymphatic:  No cervical or inguinal lymphadenopathy  Neurologic:  Patient is awake alert, responding to command, well-oriented to time and place and person moving     LABORATORY RESULTS:    Results from last 7 days  Lab Units 06/20/18  0444 06/20/18  0141 06/19/18  2226   TROPONIN I ng/mL 0 03 0 04 0 05*       CBC with diff:   Results from last 7 days  Lab Units 06/20/18  0444 06/19/18  1422   WBC Thousand/uL 13 11* 12 06*   HEMOGLOBIN g/dL 15 9 16 9   HEMATOCRIT % 49 5* 52 2*   MCV fL 90 90   PLATELETS Thousands/uL 209 225   MCH pg 28 8 29 0   MCHC g/dL 32 1 32 4   RDW % 12 7 12 6   MPV fL 11 0 11 0   NRBC AUTO /100 WBCs  --  0       CMP:  Results from last 7 days  Lab Units 18  0444 18  1422   SODIUM mmol/L 138 139   POTASSIUM mmol/L 3 9 4 0   CHLORIDE mmol/L 102 102   CO2 mmol/L 23 30   ANION GAP mmol/L 13 7   BUN mg/dL 21 20   CREATININE mg/dL 1 30 1 44*   GLUCOSE RANDOM mg/dL 130 98   CALCIUM mg/dL 8 8 9 1   AST U/L  --  20   ALT U/L  --  35   ALK PHOS U/L  --  85   TOTAL PROTEIN g/dL  --  8 6*   BILIRUBIN TOTAL mg/dL  --  0 70   EGFR ml/min/1 73sq m 69 61       BMP:  Results from last 7 days  Lab Units 18  0444 18  1422   SODIUM mmol/L 138 139   POTASSIUM mmol/L 3 9 4 0   CHLORIDE mmol/L 102 102   CO2 mmol/L 23 30   BUN mg/dL 21 20   CREATININE mg/dL 1 30 1 44*   GLUCOSE RANDOM mg/dL 130 98   CALCIUM mg/dL 8 8 9 1         Results from last 7 days  Lab Units 18  1422   NT-PRO BNP pg/mL 159*            Results from last 7 days  Lab Units 18  0444   HEMOGLOBIN A1C % 6 9*               Lipid Profile:   Lab Results   Component Value Date    CHOL 186 2018    CHOL 189 2017    CHOL 121 2015     Lab Results   Component Value Date    HDL 32 (L) 2018    HDL 32 (L) 2017    HDL 30 2015     Lab Results   Component Value Date    LDLCALC 138 (H) 2018    LDLCALC 118 (H) 2017    LDLCALC 72 2015     Lab Results   Component Value Date    TRIG 78 2018    TRIG 197 (H) 2017    TRIG 95 2015       Cardiac testing:  Results for orders placed during the hospital encounter of 18   Echo complete with contrast if indicated    Narrative [address] [place] [id number] [phone]    Transthoracic Echocardiogram  2D, M-mode, Doppler, and Color Doppler    Study date:  2018    Patient: Nayely Dobson  MR number: [id number]  Account number: [de-identified]  : 1980  Age: 42 years  Gender: Male  Status: Outpatient  Location: Bedside  Height: 70 in  Weight: 250 lb  BP: 127/ 68 mmHg    Indications: Acute Myocardial Infarction    Diagnoses: 410 80 - AMI NEC, UNSPECIFIED    Sonographer:  ANA Wahl,RDCS  Interpreting Physician:  Denita Santiago MD  Referring Physician:  aAron Reed DO  Group:  [facility]    SUMMARY    LEFT VENTRICLE:  The ventricle was moderately dilated  Ejection fraction was estimated to be 15 %  There was severe diffuse hypokinesis  LEFT ATRIUM:  The atrium was mildly to moderately dilated  MITRAL VALVE:  There was moderate regurgitation  TRICUSPID VALVE:  There was trace regurgitation  PULMONIC VALVE:  There was trace regurgitation  HISTORY: PRIOR HISTORY: Chest Pain, Elevated Troponins, Cardiomyopathy, Congestive Heart Failure    PROCEDURE: The procedure was performed at the bedside  This was a routine study  The transthoracic approach was used  The study included complete 2D imaging, M-mode, complete spectral Doppler, and color Doppler  The heart rate was 91 bpm,  at the start of the study  Images were obtained from the parasternal, apical, subcostal, and suprasternal notch acoustic windows  Intravenous contrast ( 0 6ml Definity used in NSS) was administered to opacify the left ventricle  Image  quality was adequate  LEFT VENTRICLE: The ventricle was moderately dilated  Ejection fraction was estimated to be 15 %  There was severe diffuse hypokinesis  RIGHT VENTRICLE: The size was normal  Systolic function was normal  Wall thickness was normal     LEFT ATRIUM: The atrium was mildly to moderately dilated  RIGHT ATRIUM: Size was normal     MITRAL VALVE: Valve structure was normal  There was normal leaflet separation  DOPPLER: The transmitral velocity was within the normal range  There was no evidence for stenosis  There was moderate regurgitation  AORTIC VALVE: The valve was trileaflet  Leaflets exhibited normal thickness and normal cuspal separation  DOPPLER: Transaortic velocity was within the normal range  There was no evidence for stenosis   There was no regurgitation  TRICUSPID VALVE: The valve structure was normal  There was normal leaflet separation  DOPPLER: The transtricuspid velocity was within the normal range  There was no evidence for stenosis  There was trace regurgitation  PULMONIC VALVE: Leaflets exhibited normal thickness, no calcification, and normal cuspal separation  DOPPLER: The transpulmonic velocity was within the normal range  There was trace regurgitation  PERICARDIUM: There was no pericardial effusion  The pericardium was normal in appearance  AORTA: The root exhibited normal size      SYSTEM MEASUREMENT TABLES    2D  %FS: 7 8 %  Ao Diam: 2 8 cm  EDV(Teich): 232 9 ml  EF(Teich): 16 8 %  ESV(Teich): 193 8 ml  IVSd: 0 8 cm  LA Area: 24 5 cm2  LA Diam: 5 cm  LVEDV MOD A4C: 334 7 ml  LVEF MOD A4C: 37 8 %  LVESV MOD A4C: 208 4 ml  LVIDd: 6 7 cm  LVIDs: 6 2 cm  LVLd A4C: 11 3 cm  LVLs A4C: 11 1 cm  LVPWd: 1 1 cm  RA Area: 16 5 cm2  RVIDd: 2 9 cm  SV MOD A4C: 126 4 ml  SV(Teich): 39 1 ml    CW  MR VTI: 147 9 cm  MR Vmax: 5 m/s  MR Vmean: 3 7 m/s  MR maxP 1 mmHg  MR meanP 6 mmHg  TR Vmax: 2 6 m/s  TR maxP 1 mmHg    MM  TAPSE: 1 8 cm    PW  MV A Robin: 0 7 m/s  MV Dec Long: 11 4 m/s2  MV DecT: 86 8 ms  MV E Robin: 1 m/s  MV E/A Ratio: 1 5  MV PHT: 25 2 ms  MVA By PHT: 8 7 cm2    [employer] Accredited Echocardiography Laboratory    Prepared and electronically signed by    Brennon Garces MD  Signed 2018 16:08:19         Meds/Allergies   all current active meds have been reviewed and current meds:   Current Facility-Administered Medications   Medication Dose Route Frequency    acetaminophen (TYLENOL) tablet 650 mg  650 mg Oral Q6H PRN    aspirin (ECOTRIN LOW STRENGTH) EC tablet 81 mg  81 mg Oral Daily    furosemide (LASIX) tablet 30 mg  30 mg Oral BID    heparin (porcine) subcutaneous injection 5,000 Units  5,000 Units Subcutaneous Q8H [facility]    insulin lispro (HumaLOG) 100 units/mL subcutaneous injection 1-5 Units  1-5 Units Subcutaneous HS    insulin lispro (HumaLOG) 100 units/mL subcutaneous injection 2-12 Units  2-12 Units Subcutaneous TID AC    lisinopril (ZESTRIL) tablet 5 mg  5 mg Oral Daily    metoprolol succinate (TOPROL-XL) 24 hr tablet 150 mg  150 mg Oral Daily    nitroglycerin (NITROSTAT) SL tablet 0 4 mg  0 4 mg Sublingual Q5 Min PRN    spironolactone (ALDACTONE) tablet 25 mg  25 mg Oral Daily     Prescriptions Prior to Admission   Medication    aspirin 81 mg chewable tablet    furosemide (LASIX) 20 mg tablet    lisinopril (ZESTRIL) 20 mg tablet    metoprolol succinate (TOPROL-XL) 100 mg 24 hr tablet    spironolactone (ALDACTONE) 25 mg tablet          ASSESSMENT & PLAN   1  Chest pain:  -serial troponin 0 02, 0 05, 0 04, 0 03   -EKG does show ST segment and T-wave abnormalities in lateral leads  -CXR unremarkable  CTA chest shows stable 7 mm right middle lobe nodule, fatty liver  -ECHO shows EF 15%, severe diffuse hypokinesis, moderate MR  -stress test shows fixed defect but no evidence of ischemia  -continue aspirin, Toprol        2  Nonischemic cardiomyopathy with EF 54%, Chronic systolic CHF, unclear etiology:  -  Euvolemic on exam   Does not appear to be in acute exacerbation  -EF in 2016 was 40-45%  ECHO this admission shows EF 15%, severe diffuse hypokinesis, moderate MR  Unknown etiology for decreased EF   -continue Toprol  Will increase lisinopril to 5 mg daily  Will resume home dose of spironolactone   -will be getting fit for LifeVest today   -low-salt diet, daily weights      3  Hypertension:  Stable, continue present regimen  Will increase lisinopril to 5 mg daily  Will resume home dose of spironolactone  Once patient gets LifeVest, will be okay for discharge from cardiac standpoint  Will follow up with Congestive heart failure specialist Dr Jenifer Son in 1 week      Carlie Goode PA-C  6/21/2018,8:56 AM    Portions of the record may have been created with voice recognition software   Occasional wrong word or "sound a like" substitutions may have occurred due to the inherent limitations of voice recognition software   Read the chart carefully and recognize, using context, where substitutions have occurred

## 2018-06-21 NOTE — PLAN OF CARE
Problem: DISCHARGE PLANNING - CARE MANAGEMENT  Goal: Discharge to post-acute care or home with appropriate resources  INTERVENTIONS:  - Conduct assessment to determine patient/family and health care team treatment goals, and need for post-acute services based on payer coverage, community resources, and patient preferences, and barriers to discharge  - Address psychosocial, clinical, and financial barriers to discharge as identified in assessment in conjunction with the patient/family and health care team  - Arrange appropriate level of post-acute services according to patients   needs and preference and payer coverage in collaboration with the physician and health care team  - Communicate with and update the patient/family, physician, and health care team regarding progress on the discharge plan  - Arrange appropriate transportation to post-acute venues   Outcome: Completed Date Met: 06/21/18  CM contacted Arnold from [place] [address] to inquire about life vest  Due to pt's 2nd use, insurance is taking longer to approve vest  Pt will still get vest delivered to room today  CM informed SLIM and nurse  Pt has no other needs at this time  CM department to follow pt through dc progress

## 2018-06-21 NOTE — CASE MANAGEMENT
Thank you,  [address] [facility] in the [facility] by Denton Barrera for 2017  Network Utilization Review Department  Phone: [phone]; Fax [phone]  ATTENTION: The Network Utilization Review Department is now centralized for our 7 Facilities  Make a note that we have a new phone and fax numbers for our Department  Please call with any questions or concerns to [phone] and carefully follow the prompts so that you are directed to the right person  All voicemails are confidential  Fax any determinations, approvals, denials, and requests for initial or continue stay review clinical to [phone]  Due to HIGH CALL volume, it would be easier if you could please send faxed requests to expedite your requests and in part, help us provide discharge notifications faster  Continued Stay Review    Date: 6/21/18  OBSERVATION     Vital Signs: /74 (BP Location: Left arm)   Pulse 89   Temp 98 4 °F (36 9 °C) (Oral)   Resp 18   Ht 5' 10" (1 778 m)   Wt 113 kg (250 lb)   SpO2 97%   BMI 35 87 kg/m²     Medications:   Scheduled Meds:   Current Facility-Administered Medications:  acetaminophen 650 mg Oral Q6H PRN   aspirin 81 mg Oral Daily   furosemide 30 mg Oral BID   heparin (porcine) 5,000 Units Subcutaneous Q8H [facility]   insulin lispro 1-5 Units Subcutaneous HS   insulin lispro 2-12 Units Subcutaneous TID AC   lisinopril 5 mg Oral Daily   metoprolol succinate 150 mg Oral Daily   nitroglycerin 0 4 mg Sublingual Q5 Min PRN   spironolactone 25 mg Oral Daily       Abnormal Labs/Diagnostic Results:   6/20 Echo: EF 15%, severe diffuse hypokinesis; mild to mod L atrial dilation; mod mitral regurg; tr tricuspid/pulmonic regurg  Nuc stress: Abnormal study  Moderate sized, moderate intensity fixed inferior defect likely representing diaphrgmatic attenuation  ( Inferior defect gets better with prone imaging)  No ischemia   Left ventricular systolic function was  severely reduced  Age/Sex: 42 y o  male     Assessment/Plan:   PER CARDIO 620 1523:  Await echo and nuc stress  PER MED 6/20 @1427:  Waiting echo results, will need life vest prior to discharge ir low echo  Might need to titrate cardiomyopathy meds  Has fatty liver, encouraged diet/exercise  Bmi>35  Likely reactive leukocytosis  PER CARDIO 6/21 @0909:  -ECHO shows EF 15%-unknown cause for decrease from 40-45%; severe diffuse hypokinesis, moderate MR    -stress test shows fixed defect but no evidence of ischemia  -continue aspirin, Toprol  increase lisinopril to 5mg daily, lo salt diet, daily wt  Needs life vest for discharge  Admission Orders:  Scheduled Meds:   Current Facility-Administered Medications:  acetaminophen 650 mg Oral Q6H PRN   aspirin 81 mg Oral Daily   furosemide 30 mg Oral BID   heparin (porcine) 5,000 Units Subcutaneous Q8H [address]   insulin lispro 1-5 Units Subcutaneous HS   insulin lispro 2-12 Units Subcutaneous TID AC   lisinopril 5 mg Oral Daily   metoprolol succinate 150 mg Oral Daily   nitroglycerin 0 4 mg Sublingual Q5 Min PRN   spironolactone 25 mg Oral Daily     6/20: DC plan:The patient will continue to require additional inpatient hospital stay due to Worsening ejection fraction/cardiomyopathy  6/21: being discharged today  Must have lifevest prior to dc, expected delivery @1600

## 2018-06-21 NOTE — PROGRESS NOTES
[facility] Internal Medicine Progress Note  Patient: Eyad Smith 42 y o  male   MRN: [id number]  PCP: Antoine Thomas MD  Unit/Bed#: -[facility] Encounter: [id number]  Date Of Visit: 06/21/18    Assessment/Plan:    Principal Problem:    Chest pain  Active Problems:    Leukocytosis    Cardiomyopathy, dilated, nonischemic (HCC)    Incidental pulmonary nodule, > 3mm and < 8mm    Fatty liver disease, nonalcoholic    Chronic systolic heart failure ([address] )    Diabetes mellitus type 2 in obese ([facility] [address] )    Class 2 obesity due to excess calories in adult    Present on Admission:   Chest pain   Chronic systolic heart failure (HCC)   Leukocytosis   Cardiomyopathy, dilated, nonischemic ([address] )   Incidental pulmonary nodule, > 3mm and < 8mm   Fatty liver disease, nonalcoholic   Diabetes mellitus type 2 in obese ([address] )   Class 2 obesity due to excess calories in adult      · Chest pain-atypical   Patient had a stress test   Also had an echocardiogram which showed significantly worse ejection fraction as compared to his previous echocardiogram which was done in 2016  Awaiting for life vest to be arranged  If arranged today, he will be discharged home  · Nonischemic cardiomyopathy/chronic systolic heart failure  Patient likely also has underlying sleep apnea  Needs to have further testing done on outpatient basis  Patient is on diuretic  I had lengthy discussion with him today  He takes Lasix 40 mg 2 tablets in the evening  Also lisinopril 20 mg daily in addition to Aldactone 25 mg daily and Toprol  mg daily  Discussed with Cardiology  Patient is okay to go on Lasix 40 mg twice daily in addition to same dose of Aldactone and Toprol XL  They recommended patient's lisinopril to be only 5 mg daily because of somewhat low blood pressure  · Fatty liver  Non alcoholic  He is overweight  Needs to work on his diet and exercise  · Leukocytosis-likely reactive    No need for any further workup  · Diabetes mellitus type 2 uncontrolled  Hemoglobin A1c is 6 9  Again discussed with him about importance of diet and exercise  VTE Pharmacologic Prophylaxis:   Pharmacologic: Heparin  Mechanical VTE Prophylaxis in Place: Yes    Patient Centered Rounds: I have performed bedside rounds with nursing staff today  Discussions with Specialists or Other Care Team Provider:   Yes    Education and Discussions with Family / Patient:   Yes     Time Spent for Care: 45+ minutes  More than 50% of total time spent on counseling and coordination of care as described above  Current Length of Stay: 0 day(s)    Current Patient Status: Observation   Certification Statement: Cardiomyopathy-needs arrangement for life vest    Discharge Plan:   Home when life vest arranged    Code Status: Level 1 - Full Code      Subjective:   Feels fine  Denies any chest pain or shortness of breath  Denies any fever or chills  Denies any nausea, vomiting, vomiting or diarrhea  No abdominal pain    Objective:     Vitals:   Temp (24hrs), Av 2 °F (36 8 °C), Min:98 °F (36 7 °C), Max:98 4 °F (36 9 °C)    HR:  [60-92] 87  Resp:  [18] 18  BP: (117-123)/(58-74) 119/71  SpO2:  [96 %-98 %] 98 %  Body mass index is 35 87 kg/m²  Input and Output Summary (last 24 hours): Intake/Output Summary (Last 24 hours) at 18 1544  Last data filed at 18 1408   Gross per 24 hour   Intake             1520 ml   Output             2025 ml   Net             -505 ml           Physical Exam:     Vital signs are reviewed as above  Constitutional:  Patient up in the chair   Eyes: EOM grossly intact  HENT: Oropharynx are moist  Did not notice any significant lesions on the tongue  Head normocephalic  Neck: Neck is supple  Cardiac: I did not hear any rubs or gallop  Patient appears to be in sinus rhythm  Respiratory: Patient not in significant respiratory distress  Air entry in general is fair  GI: Abdomen is obese and soft  It is grossly nontender   Bowel sounds are adequate  I was not able to appreciate any hepatosplenomegaly  Neurologic:  Patient is awake and alert  Neurological examination is grossly intact  No obvious focal neurological deficit noticed  Skin: Skin is warm and dry  Psychiatric: Mood and affect are pleasant  Musculoskeletal  Patient moving all extremities   Extremities: Patient has mild bilateral lower extremities edema      Additional Data:     Labs:      Results from last 7 days  Lab Units 06/20/18  0444 06/19/18  1422   WBC Thousand/uL 13 11* 12 06*   HEMOGLOBIN g/dL 15 9 16 9   HEMATOCRIT % 49 5* 52 2*   PLATELETS Thousands/uL 209 225   NEUTROS PCT %  --  74   LYMPHS PCT %  --  14   MONOS PCT %  --  11   EOS PCT %  --  1       Results from last 7 days  Lab Units 06/21/18  0932  06/19/18  1422   SODIUM mmol/L 135*  < > 139   POTASSIUM mmol/L 3 9  < > 4 0   CHLORIDE mmol/L 103  < > 102   CO2 mmol/L 27  < > 30   BUN mg/dL 20  < > 20   CREATININE mg/dL 1 32*  < > 1 44*   CALCIUM mg/dL 8 5  < > 9 1   TOTAL PROTEIN g/dL  --   --  8 6*   BILIRUBIN TOTAL mg/dL  --   --  0 70   ALK PHOS U/L  --   --  85   ALT U/L  --   --  35   AST U/L  --   --  20   GLUCOSE RANDOM mg/dL 160*  < > 98   < > = values in this interval not displayed  * I Have Reviewed All Lab Data Listed Above  * Additional Pertinent Lab Tests Reviewed:  All Labs Within Last 24 Hours Reviewed      Recent Cultures (last 7 days):           Last 24 Hours Medication List:     Current Facility-Administered Medications:  acetaminophen 650 mg Oral Q6H PRN Chrissy Herrera DO   aspirin 81 mg Oral Daily Chrissy Herrera DO   furosemide 40 mg Oral BID (diuretic) Juanito Bains MD   heparin (porcine) 5,000 Units Subcutaneous [facility] Juanito Bains MD   insulin lispro 1-5 Units Subcutaneous HS Chrissy Herrera DO   insulin lispro 2-12 Units Subcutaneous TID AC Chrissy Herrera DO   lisinopril 5 mg Oral Daily Debra Tolbert PA-C   metoprolol succinate 150 mg Oral Daily Chrissy Herrera DO nitroglycerin 0 4 mg Sublingual Q5 Min PRN Olimpia Lo DO   spironolactone 25 mg Oral Daily Mariel Noyola PA-C        Today, Patient Was Seen By: Edouard Bañuelos MD    ** Please Note: Dragon 360 Dictation voice to text software may have been used in the creation of this document   **

## 2018-06-21 NOTE — DISCHARGE SUMMARY
Discharge Summary - [facility] Internal Medicine    Patient Information: Katherine Kehr 42 y o  male MRN: [id number]  Unit/Bed#: -01 Encounter: [id number]    Discharging Physician / Practitioner: Shaggy Lance MD  PCP: Lia Costello MD  Admission Date: 6/19/2018  Discharge Date: 06/22/18    Reason for Admission:  Chest pain    Discharge Diagnoses:     Principal Problem:    Cardiomyopathy, dilated, nonischemic ([address] )  Active Problems:    Leukocytosis    Incidental pulmonary nodule, > 3mm and < 8mm    Fatty liver disease, nonalcoholic    Chest pain    Chronic systolic heart failure ([address] )    Diabetes mellitus type 2 in obese ([facility] )    Class 2 obesity due to excess calories in adult  Resolved Problems:    * No resolved hospital problems  *    Present on Admission:   Chest pain   Chronic systolic heart failure (HCC)   Leukocytosis   Cardiomyopathy, dilated, nonischemic (HCC)   Incidental pulmonary nodule, > 3mm and < 8mm   Fatty liver disease, nonalcoholic   Diabetes mellitus type 2 in obese ([address] )   Class 2 obesity due to excess calories in adult    Consultations During Hospital Stay:  · Cardiology    Procedures Performed:     · Stress test-no reversible defect-significantly reduced ejection fraction  · Echocardiogram showing ejection fraction down to 15%    Significant Findings:     · As above    Incidental Findings:   · None     Test Results Pending at Discharge (will require follow up): · None     Outpatient Tests Requested:  · None    Complications:  None    Hospital Course:     Katherine Kehr is a 42 y o  male patient who originally presented to the hospital on 6/19/2018 due to chest pain and also shortness of breath  He has history of nonischemic cardiomyopathy which started improving, however, he did not get his echocardiogram-ordered long time ago last year  He had further cardiac workup done including a stress test and also an echocardiogram   His ejection fraction is down to 15% now  Life vest is being arranged  He is going to go home on lisinopril 5 mg daily in addition to Aldactone 25 mg daily and Lasix 40 mg twice a day  He would also continue his Toprol  mg p o  daily  Patient already has appointment with his cardiologist in the next few days  He was counseled healthy diet and exercise and specially low-salt diet  He is nondiabetic-diet controlled  Hemoglobin A1c is 6 9  Advised him on diabetic diet as well  He likely also has underlying sleep apnea and needs formal studies and follow-up on outpatient basis  Condition at Discharge: good     Discharge Day Visit / Exam:     Subjective:  Feels fine  Still waiting for life vest to arrive  Finally it came in  Denies any chest pain  Breathing slightly better  No fever or chills  Vitals: Blood Pressure: 118/67 (06/22/18 0700)  Pulse: 58 (06/22/18 0700)  Temperature: 98 6 °F (37 °C) (06/22/18 0700)  Temp Source: Oral (06/22/18 0700)  Respirations: 20 (06/22/18 0700)  Height: 5' 10" (177 8 cm) (06/19/18 1800)  Weight - Scale: 113 kg (250 lb) (06/19/18 1314)  SpO2: 96 % (06/22/18 0700)  Exam:        Vital signs are reviewed as above  Constitutional:   Up in the chair  Eyes: EOM grossly intact  HENT: Oropharynx are moist  Did not notice any significant lesions on the tongue  Head normocephalic  Neck: Neck is supple  Less distended neck vein  Cardiac: I did not hear any rubs or gallop  Patient appears to be in sinus rhythm  Respiratory: Patient not in significant respiratory distress  Air entry in general is fair  GI: Abdomen is obese and soft  It is grossly nontender  Bowel sounds are adequate  I was not able to appreciate any hepatosplenomegaly  Neurologic:  Patient is awake and alert  Neurological examination is grossly intact  No obvious focal neurological deficit noticed  Skin: Skin is warm and dry  Psychiatric: Mood and affect are pleasant  Musculoskeletal  Patient moving all extremities     Extremities: Patient has decreasing edema of his legs  No cyanosis or clubbing noticed                Discharge instructions/Information to patient and family:   See after visit summary for information provided to patient and family  Provisions for Follow-Up Care:  See after visit summary for information related to follow-up care and any pertinent home health orders  Disposition:     Home    For Discharges to [facility] SNF:   · Not Applicable to this Patient - Not Applicable to this Patient    Planned Readmission:  None     Discharge Statement:  I spent 35+ minutes discharging the patient  This time was spent on the day of discharge  I had direct contact with the patient on the day of discharge  Greater than 50% of the total time was spent examining patient, answering all patient questions, arranging and discussing plan of care with patient as well as directly providing post-discharge instructions  Additional time then spent on discharge activities  Discharge Medications:  See after visit summary for reconciled discharge medications provided to patient and family  ** Please Note: Dragon 360 Dictation voice to text software may have been used in the creation of this document   **

## 2018-06-21 NOTE — SOCIAL WORK
CM contacted Arnold from [place] [id number] to inquire about life vest  Due to pt's 2nd use, insurance is taking longer to approve vest  Pt will still get vest delivered to room today  CM informed SLIM and nurse  Pt has no other needs at this time  CM department to follow pt through dc progress

## 2018-06-21 NOTE — PLAN OF CARE
Problem: DISCHARGE PLANNING - CARE MANAGEMENT  Goal: Discharge to post-acute care or home with appropriate resources  INTERVENTIONS:  - Conduct assessment to determine patient/family and health care team treatment goals, and need for post-acute services based on payer coverage, community resources, and patient preferences, and barriers to discharge  - Address psychosocial, clinical, and financial barriers to discharge as identified in assessment in conjunction with the patient/family and health care team  - Arrange appropriate level of post-acute services according to patients   needs and preference and payer coverage in collaboration with the physician and health care team  - Communicate with and update the patient/family, physician, and health care team regarding progress on the discharge plan  - Arrange appropriate transportation to post-acute venues  Outcome: Progressing  CM received script for Live Vest  CM sent it to [place] [address] and contacted [facility] to assure receipt  Life Vest will be delivered today to pt's room  Pt has no other needs at this time  CM department to follow pt through dc process

## 2018-06-22 VITALS
WEIGHT: 250 LBS | OXYGEN SATURATION: 96 % | HEIGHT: 70 IN | TEMPERATURE: 98.3 F | RESPIRATION RATE: 20 BRPM | HEART RATE: 95 BPM | BODY MASS INDEX: 35.79 KG/M2 | SYSTOLIC BLOOD PRESSURE: 116 MMHG | DIASTOLIC BLOOD PRESSURE: 67 MMHG

## 2018-06-22 LAB — GLUCOSE SERPL-MCNC: 108 MG/DL (ref 65–140)

## 2018-06-22 PROCEDURE — 99217 PR OBSERVATION CARE DISCHARGE MANAGEMENT: CPT | Performed by: INTERNAL MEDICINE

## 2018-06-22 PROCEDURE — 82948 REAGENT STRIP/BLOOD GLUCOSE: CPT

## 2018-06-22 RX ADMIN — SPIRONOLACTONE 25 MG: 25 TABLET, FILM COATED ORAL at 08:37

## 2018-06-22 RX ADMIN — ASPIRIN 81 MG: 81 TABLET, COATED ORAL at 08:36

## 2018-06-22 RX ADMIN — FUROSEMIDE 40 MG: 40 TABLET ORAL at 08:37

## 2018-06-22 RX ADMIN — METOPROLOL SUCCINATE 150 MG: 100 TABLET, EXTENDED RELEASE ORAL at 08:37

## 2018-06-22 RX ADMIN — LISINOPRIL 5 MG: 5 TABLET ORAL at 08:37

## 2018-06-22 RX ADMIN — HEPARIN SODIUM 5000 UNITS: 5000 INJECTION, SOLUTION INTRAVENOUS; SUBCUTANEOUS at 05:54

## 2018-06-22 NOTE — CASE MANAGEMENT
Continued Stay Review    Date: 6/22/18    Vital Signs: /67 (BP Location: Left arm)   Pulse 58   Temp 98 6 °F (37 °C) (Oral)   Resp 20   Ht 5' 10" (1 778 m)   Wt 113 kg (250 lb)   SpO2 96% on Room Air   BMI 35 87 kg/m² No pain    Medications:   Scheduled Meds:   Current Facility-Administered Medications:  acetaminophen 650 mg Oral Q6H PRN   aspirin 81 mg Oral Daily   furosemide 40 mg Oral BID (diuretic)   heparin (porcine) 5,000 Units Subcutaneous Q8H [address]   insulin lispro 1-5 Units Subcutaneous HS   insulin lispro 2-12 Units Subcutaneous TID AC   lisinopril 5 mg Oral Daily   melatonin 3 mg Oral HS   metoprolol succinate 150 mg Oral Daily   nitroglycerin 0 4 mg Sublingual Q5 Min PRN   spironolactone 25 mg Oral Daily       Age/Sex: 42 y o  male         Discharge Plan: TBD              Thank you,  [address] [place] [address] in the [facility] for 2017  Network Utilization Review Department  Phone: [phone]; Fax [phone]  ATTENTION: The Network Utilization Review Department is now centralized for our 7 Facilities  Make a note that we have a new phone and fax numbers for our Department  Please call with any questions or concerns to [phone] and carefully follow the prompts so that you are directed to the right person  All voicemails are confidential  Fax any determinations, approvals, denials, and requests for initial or continue stay review clinical to [phone]  Due to HIGH CALL volume, it would be easier if you could please send faxed requests to expedite your requests and in part, help us provide discharge notifications faster

## 2018-06-22 NOTE — NURSING NOTE
Representative from life vest is here now, fitting the patient with the vest  She stated that patient can keep the telemetry monitor while she fits the vest, she will call for assistance if needed

## 2018-06-22 NOTE — NURSING NOTE
Patient has the life vest on at this time  Telemetry monitor removed  IV line removed, no s/s of bleeding or infection  Discharge paperwork and prescriptions reviewed and given to the patient  Patient currently eating lunch, waiting for a ride from his brother

## 2018-06-29 ENCOUNTER — OFFICE VISIT (OUTPATIENT)
Dept: CARDIOLOGY CLINIC | Facility: CLINIC | Age: 38
End: 2018-06-29
Payer: COMMERCIAL

## 2018-06-29 VITALS
SYSTOLIC BLOOD PRESSURE: 112 MMHG | HEART RATE: 84 BPM | BODY MASS INDEX: 40.61 KG/M2 | OXYGEN SATURATION: 97 % | DIASTOLIC BLOOD PRESSURE: 80 MMHG | WEIGHT: 283 LBS

## 2018-06-29 DIAGNOSIS — I50.22 CHRONIC SYSTOLIC HEART FAILURE (HCC): Primary | ICD-10-CM

## 2018-06-29 DIAGNOSIS — E78.5 HYPERLIPIDEMIA, UNSPECIFIED HYPERLIPIDEMIA TYPE: ICD-10-CM

## 2018-06-29 DIAGNOSIS — R06.83 SNORES: ICD-10-CM

## 2018-06-29 PROCEDURE — 99214 OFFICE O/P EST MOD 30 MIN: CPT | Performed by: INTERNAL MEDICINE

## 2018-06-29 RX ORDER — ATORVASTATIN CALCIUM 80 MG/1
80 TABLET, FILM COATED ORAL DAILY
Qty: 90 TABLET | Refills: 3 | Status: SHIPPED | OUTPATIENT
Start: 2018-06-29 | End: 2019-07-26 | Stop reason: SDUPTHER

## 2018-06-29 NOTE — LETTER
June 29, 2018     Tisha Padilla MD  [address]    Patient: Haseeb Alvarado   YOB: 1980   Date of Visit: 6/29/2018       Dear Dr Danisha Cano: Thank you for referring Jeanette Lucas to me for evaluation  Below are my notes for this consultation  If you have questions, please do not hesitate to call me  I look forward to following your patient along with you  Sincerely,        Carlos Rios MD        CC: DO Carlos Fajardo MD  6/29/2018  9:19 AM  Sign at close encounter    Heart Failure Outpatient Follow Up Note - Haseeb Alvarado 42 y o  male MRN: [id number]    @ Encounter: [id number]      Assessment/Plan:    Patient Active Problem List    Diagnosis Date Noted    Diabetes mellitus type 2 in obese ([facility] ) 06/21/2018    Class 2 obesity due to excess calories in adult 06/21/2018    Chest pain 06/19/2018    Chronic systolic heart failure ([facility] ) 06/19/2018    Cardiomyopathy, dilated, nonischemic ([facility] ) 03/25/2017    Chronic combined systolic and diastolic congestive heart failure ([facility] ) 03/25/2017    Angina of effort ([facility] ) 03/25/2017    Incidental pulmonary nodule, > 3mm and < 8mm 03/25/2017    Fatty liver disease, nonalcoholic 70/16/1273    Exertional chest pain 03/24/2017    Elevated troponin 03/24/2017    Hyperglycemia 03/24/2017    Leukocytosis 03/24/2017     # NIDCM, LVEF ~15%, LVIDd 6 7 cm, NYHA II/III, ACC/AHA Stage C/D: Prior hx of LVEF <20% recovered to ~40-45% on medical therapy, now re-reduced  Unclear reason for re-crudescence  Patient states compliant with medications  No recent illness  No palpitations  ? Natural progression +/- stress induced +/- viral +/- genetic +/- other  Has had extensive workup in the past  Will repeat some testing as noted below:  Diag:  --cMRI 6/23/2015: LVEF ~ 15%, LVIDd 6 7 cm, Mildly reduced RVSF  Mod MR   Intramyocardial fibrosis seen in the basal anteroseptal wall suggestive of an idiopathic cardiomyopathy  Given myocardial edema, this may suggest acute or subacute  Fibrosis of the right ventricular insertion point into the   interventricular septum, suggesting pulmonary hypertension  --TTE 6/17/2015: LVEF ~25%  --TTE 10/8/2015: LVEF ~40%  --TTE 4/7/2016: LVEF ~40-45%    To do:  --TSH, ferritin, SPEP, HIV (negative in the past so will not retest at this time)  --Snores at night; PSG  --Repeat cMRI to reassess  --Genetic testing - DCM panel    Therapeutic:  --2g sodium diet  --2000 ml fluid restriction    Neurohormonal Blockade:  --Beta-Blocker: Continue metoprolol succinate 100 mg PO daily  --ACEi, ARB or ARNi: Stop lisinopril today (takes at night, last dose 6/28/18 evening); Start Entresto 24-26 mg PO BID tomorrow night (6/30/18); BMP next week  --Aldosterone Receptor Blocker: Continue spironolactone 25 mg PO daily  --Diuretic: Continue lasix 40 mg PO BID; may have enhanced diuretic effect of sacubtril    Sudden Cardiac Death Risk Reduction:  --ICD: Continue LifeVest as bridge to ICD vs recovery    Electrical Resynchronization:  --Candidacy for BiV device: Narrow QRS    Advanced Therapies: Will continue to monitor  Hx of recovery in the past  If does not recover, given age, would prefer OHT  He would need to lose a little weight prior  LVAD could be considered as bridge, but will need to evaluate RV function further prior to consideration  # HLD: Start Atorvastatin 80 mg PO daily  # HTN  # DMII  # Obesity, BMMI ~40  # Fatty liver disease    HPI: Very pleasant 43 y/o gentleman followed by Dr Magalys Acosta who was initially hospitalized @ [facility] on 4/83-24/7034 w/ acute systolic heart failure from possible subacute myocarditis that improved from LVEF <25% to 40% on medical therapy  A cardiac catheterization was done which showed no CAD  A Cardiac MRI was done which showed severe dilated ventricles and some fibrosis  It was felt his CM was consistent with subacute myocarditis   He was discharged on a Life Vest  By 10/2015 his LVEF had improved to ~40% without any decompensations and LifeVest was returned  He has been on GDMT since that time  He represented to [facility] on 6/19/2018 w/ CP and SOB  He had a stress test and echo done  The stress test did not show any evidence of ischemia, and LVEF <20%  TTE showed LVEF <20% w/ LVIDd 6 7 cm  RV borderline  He feels ok  He is not very active  When he vacuums the living room or starts to cleanup, he gets SOB  He can make it up and down a few aisles slowly at [facility], but if he does more than he feels SOB  He works as a   He states he has been feeling this way for 1-2 months  Past Medical History:   Diagnosis Date    CHF (congestive heart failure) (HCC)        Review of Systems - 12 point ROS was done and is negative, except as noted above  Allergies   Allergen Reactions    Prednisone Shortness Of Breath and Edema       Current Outpatient Prescriptions:     aspirin (ECOTRIN LOW STRENGTH) 81 mg EC tablet, Take 1 tablet (81 mg total) by mouth daily, Disp: , Rfl: 0    furosemide (LASIX) 40 mg tablet, Take 1 tablet (40 mg total) by mouth 2 (two) times a day, Disp: 60 tablet, Rfl: 2    lisinopril (ZESTRIL) 5 mg tablet, Take 1 tablet (5 mg total) by mouth daily, Disp: 30 tablet, Rfl: 2    metoprolol succinate (TOPROL-XL) 100 mg 24 hr tablet, Take 1 5 tablets (150 mg total) by mouth daily, Disp: 45 tablet, Rfl: 5    spironolactone (ALDACTONE) 25 mg tablet, Take 1 tablet (25 mg total) by mouth daily, Disp: 90 tablet, Rfl: 5    Social History     Social History    Marital status: /Civil Union     Spouse name: N/A    Number of children: N/A    Years of education: N/A     Occupational History    Not on file       Social History Main Topics    Smoking status: Former Smoker    Smokeless tobacco: Never Used    Alcohol use No    Drug use: No    Sexual activity: Not on file     Other Topics Concern    Not on file     Social History Narrative    No narrative on file       No family history on file  Physical Exam:    Vitals: Blood pressure 112/80, pulse 84, weight 128 kg (283 lb), SpO2 97 %  , Body mass index is 40 61 kg/m² ,   Wt Readings from Last 3 Encounters:   06/29/18 128 kg (283 lb)   06/19/18 113 kg (250 lb)   03/24/17 127 kg (280 lb)       Vitals:    06/29/18 0754   BP: 112/80   BP Location: Right arm   Patient Position: Sitting   Cuff Size: Standard   Pulse: 84   SpO2: 97%   Weight: 128 kg (283 lb)       GEN: Janna Pool appears well, alert and oriented x 3, pleasant and cooperative   HEENT: pupils equal, round, and reactive to light; extraocular muscles intact  NECK: supple, no carotid bruits   HEART: regular rhythm, normal S1 and S2, no murmurs, clicks, gallops or rubs, JVD is flat    LUNGS: clear to auscultation bilaterally; no wheezes, rales, or rhonchi   ABDOMEN: normal bowel sounds, soft, no tenderness, no distention  EXTREMITIES: peripheral pulses normal; no clubbing, cyanosis, or edema  NEURO: no focal findings   SKIN: normal without suspicious lesions on exposed skin    Labs & Results:  Lab Results   Component Value Date    WBC 13 11 (H) 06/20/2018    HGB 15 9 06/20/2018    HCT 49 5 (H) 06/20/2018    MCV 90 06/20/2018     06/20/2018       Chemistry        Component Value Date/Time     (L) 06/21/2018 0932     08/06/2015 0904    K 3 9 06/21/2018 0932    K 4 1 08/06/2015 0904     06/21/2018 0932     08/06/2015 0904    CO2 27 06/21/2018 0932    CO2 32 08/06/2015 0904    BUN 20 06/21/2018 0932    BUN 19 08/06/2015 0904    CREATININE 1 32 (H) 06/21/2018 0932    CREATININE 1 30 08/06/2015 0904        Component Value Date/Time    CALCIUM 8 5 06/21/2018 0932    CALCIUM 8 9 08/06/2015 0904    ALKPHOS 85 06/19/2018 1422    ALKPHOS 77 06/17/2015 2056    AST 20 06/19/2018 1422    AST 17 06/17/2015 2056    ALT 35 06/19/2018 1422    ALT 43 06/17/2015 2056    BILITOT 0 70 06/19/2018 1422    BILITOT 0 52 06/17/2015 2056        EKG personally reviewed by Daja Paul MD      Counseling / Coordination of Care  Total floor / unit time spent today 40 minutes  Greater than 50% of total time was spent with the patient and / or family counseling and / or coordination of care  A description of the counseling / coordination of care: 25 min  Thank you for the opportunity to participate in the care of this patient      Daja Paul MD, PhD   Lala Barnes

## 2018-06-29 NOTE — PROGRESS NOTES
Heart Failure Outpatient Follow Up Note - Nat Frederick 42 y o  male MRN: [id number]    @ Encounter: [id number]      Assessment/Plan:    Patient Active Problem List    Diagnosis Date Noted    Diabetes mellitus type 2 in obese ([facility] ) 06/21/2018    Class 2 obesity due to excess calories in adult 06/21/2018    Chest pain 06/19/2018    Chronic systolic heart failure ([facility] ) 06/19/2018    Cardiomyopathy, dilated, nonischemic (HCC) 03/25/2017    Chronic combined systolic and diastolic congestive heart failure ([place] [address] ) 03/25/2017    Angina of effort ([place] [address] ) 03/25/2017    Incidental pulmonary nodule, > 3mm and < 8mm 03/25/2017    Fatty liver disease, nonalcoholic 84/71/5609    Exertional chest pain 03/24/2017    Elevated troponin 03/24/2017    Hyperglycemia 03/24/2017    Leukocytosis 03/24/2017     # NIDCM, LVEF ~15%, LVIDd 6 7 cm, NYHA II/III, ACC/AHA Stage C/D: Prior hx of LVEF <20% recovered to ~40-45% on medical therapy, now re-reduced  Unclear reason for re-crudescence  Patient states compliant with medications  No recent illness  No palpitations  ? Natural progression +/- stress induced +/- viral +/- genetic +/- other  Has had extensive workup in the past  Will repeat some testing as noted below:  Diag:  --cMRI 6/23/2015: LVEF ~ 15%, LVIDd 6 7 cm, Mildly reduced RVSF  Mod MR  Intramyocardial fibrosis seen in the basal anteroseptal wall suggestive of an idiopathic cardiomyopathy  Given myocardial edema, this may suggest acute or subacute  Fibrosis of the right ventricular insertion point into the   interventricular septum, suggesting pulmonary hypertension     --TTE 6/17/2015: LVEF ~25%  --TTE 10/8/2015: LVEF ~40%  --TTE 4/7/2016: LVEF ~40-45%    To do:  --TSH, ferritin, SPEP, HIV (negative in the past so will not retest at this time)  --Snores at night; PSG  --Repeat cMRI to reassess  --Genetic testing - DCM panel    Therapeutic:  --2g sodium diet  --2000 ml fluid restriction    Neurohormonal Blockade:  --Beta-Blocker: Continue metoprolol succinate 100 mg PO daily  --ACEi, ARB or ARNi: Stop lisinopril today (takes at night, last dose 6/28/18 evening); Start Entresto 24-26 mg PO BID tomorrow night (6/30/18); BMP next week  --Aldosterone Receptor Blocker: Continue spironolactone 25 mg PO daily  --Diuretic: Continue lasix 40 mg PO BID; may have enhanced diuretic effect of sacubtril    Sudden Cardiac Death Risk Reduction:  --ICD: Continue LifeVest as bridge to ICD vs recovery    Electrical Resynchronization:  --Candidacy for BiV device: Narrow QRS    Advanced Therapies: Will continue to monitor  Hx of recovery in the past  If does not recover, given age, would prefer OHT  He would need to lose a little weight prior  LVAD could be considered as bridge, but will need to evaluate RV function further prior to consideration  # HLD: Start Atorvastatin 80 mg PO daily  # HTN  # DMII  # Obesity, BMMI ~40  # Fatty liver disease    HPI: Very pleasant 41 y/o gentleman followed by Dr Darion Mann who was initially hospitalized @ [facility] on [id number]/[id number] w/ acute systolic heart failure from possible subacute myocarditis that improved from LVEF <25% to 40% on medical therapy  A cardiac catheterization was done which showed no CAD  A Cardiac MRI was done which showed severe dilated ventricles and some fibrosis  It was felt his CM was consistent with subacute myocarditis  He was discharged on a Life Vest  By 10/2015 his LVEF had improved to ~40% without any decompensations and LifeVest was returned  He has been on GDMT since that time  He represented to [facility] on 6/19/2018 w/ CP and SOB  He had a stress test and echo done  The stress test did not show any evidence of ischemia, and LVEF <20%  TTE showed LVEF <20% w/ LVIDd 6 7 cm  RV borderline  He feels ok  He is not very active  When he vacuums the living room or starts to cleanup, he gets SOB   He can make it up and down a few aisles slowly at [facility], but if he does more than he feels SOB  He works as a   He states he has been feeling this way for 1-2 months  Past Medical History:   Diagnosis Date    CHF (congestive heart failure) ([facility])        Review of Systems - 12 point ROS was done and is negative, except as noted above  Allergies   Allergen Reactions    Prednisone Shortness Of Breath and Edema       Current Outpatient Prescriptions:     aspirin (ECOTRIN LOW STRENGTH) 81 mg EC tablet, Take 1 tablet (81 mg total) by mouth daily, Disp: , Rfl: 0    furosemide (LASIX) 40 mg tablet, Take 1 tablet (40 mg total) by mouth 2 (two) times a day, Disp: 60 tablet, Rfl: 2    lisinopril (ZESTRIL) 5 mg tablet, Take 1 tablet (5 mg total) by mouth daily, Disp: 30 tablet, Rfl: 2    metoprolol succinate (TOPROL-XL) 100 mg 24 hr tablet, Take 1 5 tablets (150 mg total) by mouth daily, Disp: 45 tablet, Rfl: 5    spironolactone (ALDACTONE) 25 mg tablet, Take 1 tablet (25 mg total) by mouth daily, Disp: 90 tablet, Rfl: 5    Social History     Social History    Marital status: /Civil Union     Spouse name: N/A    Number of children: N/A    Years of education: N/A     Occupational History    Not on file  Social History Main Topics    Smoking status: Former Smoker    Smokeless tobacco: Never Used    Alcohol use No    Drug use: No    Sexual activity: Not on file     Other Topics Concern    Not on file     Social History Narrative    No narrative on file       No family history on file  Physical Exam:    Vitals: Blood pressure 112/80, pulse 84, weight 128 kg (283 lb), SpO2 97 %  , Body mass index is 40 61 kg/m² ,   Wt Readings from Last 3 Encounters:   06/29/18 128 kg (283 lb)   06/19/18 113 kg (250 lb)   03/24/17 127 kg (280 lb)       Vitals:    06/29/18 0754   BP: 112/80   BP Location: Right arm   Patient Position: Sitting   Cuff Size: Standard   Pulse: 84   SpO2: 97%   Weight: 128 kg (283 lb)       GEN: Felix Palmer appears well, alert and oriented x 3, pleasant and cooperative   HEENT: pupils equal, round, and reactive to light; extraocular muscles intact  NECK: supple, no carotid bruits   HEART: regular rhythm, normal S1 and S2, no murmurs, clicks, gallops or rubs, JVD is flat    LUNGS: clear to auscultation bilaterally; no wheezes, rales, or rhonchi   ABDOMEN: normal bowel sounds, soft, no tenderness, no distention  EXTREMITIES: peripheral pulses normal; no clubbing, cyanosis, or edema  NEURO: no focal findings   SKIN: normal without suspicious lesions on exposed skin    Labs & Results:  Lab Results   Component Value Date    WBC 13 11 (H) 06/20/2018    HGB 15 9 06/20/2018    HCT 49 5 (H) 06/20/2018    MCV 90 06/20/2018     06/20/2018       Chemistry        Component Value Date/Time     (L) 06/21/2018 0932     08/06/2015 0904    K 3 9 06/21/2018 0932    K 4 1 08/06/2015 0904     06/21/2018 0932     08/06/2015 0904    CO2 27 06/21/2018 0932    CO2 32 08/06/2015 0904    BUN 20 06/21/2018 0932    BUN 19 08/06/2015 0904    CREATININE 1 32 (H) 06/21/2018 0932    CREATININE 1 30 08/06/2015 0904        Component Value Date/Time    CALCIUM 8 5 06/21/2018 0932    CALCIUM 8 9 08/06/2015 0904    ALKPHOS 85 06/19/2018 1422    ALKPHOS 77 06/17/2015 2056    AST 20 06/19/2018 1422    AST 17 06/17/2015 2056    ALT 35 06/19/2018 1422    ALT 43 06/17/2015 2056    BILITOT 0 70 06/19/2018 1422    BILITOT 0 52 06/17/2015 2056        EKG personally reviewed by Daja Paul MD      Counseling / Coordination of Care  Total floor / unit time spent today 40 minutes  Greater than 50% of total time was spent with the patient and / or family counseling and / or coordination of care  A description of the counseling / coordination of care: 25 min  Thank you for the opportunity to participate in the care of this patient      Daja Paul MD, PhD   Lala Barnes

## 2018-07-05 ENCOUNTER — TELEPHONE (OUTPATIENT)
Dept: CARDIOLOGY CLINIC | Facility: CLINIC | Age: 38
End: 2018-07-05

## 2018-07-09 ENCOUNTER — TELEPHONE (OUTPATIENT)
Dept: CARDIOLOGY CLINIC | Facility: CLINIC | Age: 38
End: 2018-07-09

## 2018-07-09 NOTE — TELEPHONE ENCOUNTER
Spoke with patient about his co-pay being $200 for ENTRESTO  - I called the pharmacy they do not have discount card       I explained he needs to go online and print one and hopefully this will work for his co-pay ,he will call back if there are any problems

## 2018-07-23 ENCOUNTER — HOSPITAL ENCOUNTER (OUTPATIENT)
Dept: RADIOLOGY | Facility: HOSPITAL | Age: 38
Discharge: HOME | End: 2018-07-23
Attending: INTERNAL MEDICINE
Payer: COMMERCIAL

## 2018-07-23 DIAGNOSIS — I50.22 CHRONIC SYSTOLIC HEART FAILURE (HCC): ICD-10-CM

## 2018-07-23 PROCEDURE — A9585 GADOBUTROL INJECTION: HCPCS | Performed by: INTERNAL MEDICINE

## 2018-07-23 PROCEDURE — 75561 CARDIAC MRI FOR MORPH W/DYE: CPT

## 2018-07-23 RX ADMIN — GADOBUTROL 24 ML: 604.72 INJECTION INTRAVENOUS at 10:17

## 2018-07-27 ENCOUNTER — HOSPITAL ENCOUNTER (OUTPATIENT)
Dept: SLEEP CENTER | Facility: CLINIC | Age: 38
Discharge: HOME | End: 2018-07-27
Payer: COMMERCIAL

## 2018-07-27 DIAGNOSIS — I50.22 CHRONIC SYSTOLIC HEART FAILURE (HCC): ICD-10-CM

## 2018-07-27 DIAGNOSIS — R06.83 SNORES: ICD-10-CM

## 2018-07-27 PROCEDURE — 95810 POLYSOM 6/> YRS 4/> PARAM: CPT

## 2018-07-27 PROCEDURE — 95810 POLYSOM 6/> YRS 4/> PARAM: CPT | Performed by: INTERNAL MEDICINE

## 2018-07-28 NOTE — PROGRESS NOTES
Sleep Study Documentation    Pre-Sleep Study       Sleep testing procedure explained to patient:YES    Patient napped prior to study:NO    Caffeine:Dayshift worker after 12PM   Caffeine use:NO    Alcohol:Dayshift workers after 5PM: Alcohol use:NO    Typical day for patient:YES       Study Documentation  Diagnostic   Snore:None  Supplemental O2: no    O2 flow rate (L/min) range n/a  O2 flow rate (L/min) final n/a  Minimum SaO2 59%  Baseline SaO2 98%        Mode of Therapy:n/a    EKG abnormalities: no     EEG abnormalities: no    Study Terminated:no    Patient classification: disabled       Post-Sleep Study    Medication used at bedtime or during sleep study:YES other prescription medications    Patient reports time it took to fall asleep:30 to 60 minutes    Patient reports waking up during study:3 or more times  Patient reports returning to sleep in 10 to 30 minutes  Patient reports sleeping 2 to 4 hours without dreaming  Patient reports sleep during study:typical    Patient rated sleepiness: Not sleepy or tired    PAP treatment:no

## 2018-07-30 ENCOUNTER — OFFICE VISIT (OUTPATIENT)
Dept: CARDIOLOGY CLINIC | Facility: CLINIC | Age: 38
End: 2018-07-30
Payer: COMMERCIAL

## 2018-07-30 VITALS
DIASTOLIC BLOOD PRESSURE: 70 MMHG | BODY MASS INDEX: 41.5 KG/M2 | WEIGHT: 281 LBS | SYSTOLIC BLOOD PRESSURE: 106 MMHG | HEART RATE: 81 BPM | OXYGEN SATURATION: 96 %

## 2018-07-30 DIAGNOSIS — I10 HTN (HYPERTENSION), BENIGN: ICD-10-CM

## 2018-07-30 DIAGNOSIS — I50.22 CHRONIC SYSTOLIC CHF (CONGESTIVE HEART FAILURE), NYHA CLASS 3 (HCC): Primary | ICD-10-CM

## 2018-07-30 DIAGNOSIS — E78.00 PURE HYPERCHOLESTEROLEMIA: ICD-10-CM

## 2018-07-30 PROCEDURE — 99214 OFFICE O/P EST MOD 30 MIN: CPT | Performed by: INTERNAL MEDICINE

## 2018-07-30 NOTE — PROGRESS NOTES
Heart Failure Outpatient Progress Note - Cruz Stewart 42 y o  male MRN: [id number]    @ Encounter: [id number]      Assessment/Plan:    Patient Active Problem List    Diagnosis Date Noted    TYRON (obstructive sleep apnea)     Diabetes mellitus type 2 in obese ([facility] ) 06/21/2018    Class 2 obesity due to excess calories in adult 06/21/2018    Chest pain 06/19/2018    Chronic systolic heart failure ([facility] ) 06/19/2018    Cardiomyopathy, dilated, nonischemic (HCC) 03/25/2017    Chronic combined systolic and diastolic congestive heart failure ([facility] ) 03/25/2017    Angina of effort ([place] [address] ) 03/25/2017    Incidental pulmonary nodule, > 3mm and < 8mm 03/25/2017    Fatty liver disease, nonalcoholic 06/35/0367    Exertional chest pain 03/24/2017    Elevated troponin 03/24/2017    Hyperglycemia 03/24/2017    Leukocytosis 03/24/2017     # Chronic systolic CHF, Stage C, NYHA 2-3  Etiology: NICM  Prior hx of LVEF <20% recovered to ~40-45% on medical therapy, now re-reduced  Unclear reason for re-crudescence  Patient states compliant with medications  No recent illness  No palpitations  ? Natural progression +/- stress induced +/- viral +/- genetic +/- other  Has had extensive workup in the past   Weight: 281 lbs- stable    Diag:  --cMRI 6/23/2015: LVEF ~ 15%, LVIDd 6 7 cm, Mildly reduced RVSF  Mod MR  Intramyocardial fibrosis seen in the basal anteroseptal wall suggestive of an idiopathic cardiomyopathy   Given myocardial edema, this may suggest acute or subacute  Fibrosis of the right ventricular insertion point into the   interventricular septum, suggesting pulmonary hypertension     --TTE 6/17/2015: LVEF ~25%  --TTE 10/8/2015: LVEF ~40%  --TTE 4/7/2016: LVEF ~40-45%    Neurohormonal Blockade:  --Beta-Blocker: Continue metoprolol succinate 150 mg PO daily  --ACEi, ARB or ARNi: entresto 24/26 mg BID  --Aldosterone Receptor Blocker: Continue spironolactone 25 mg PO daily  --Diuretic: Continue lasix 40 mg PO BID; may have enhanced diuretic effect of sacubtril    Sudden Cardiac Death Risk Reduction:  --ICD: Continue LifeVest as bridge to ICD vs recovery     Electrical Resynchronization:  --Candidacy for BiV device: Narrow QRS     Advanced Therapies: Will continue to monitor  Hx of recovery in the past  If does not recover, given age, would prefer OHT  He would need to lose a little weight prior  LVAD could be considered as bridge, but will need to evaluate RV function further prior to consideration       # HLD: Start Atorvastatin 80 mg PO daily  # HTN  # DMII  # Obesity, BMMI ~40  # Fatty liver disease    TODAY'S PLAN:  No room for uptitration of meds  --2g sodium diet  - Daily weights    HPI:   43 yo male who follows for NICM  Initial hospitalization 6/17 to 6/24/15 with acute heart failure  CM likely due to subacute myocarditis  On 6/17/15 a 2 D echocardiogram showed LV was mildly to moderately dilated  Systolic function was severely reduced with an EF of 26%    A cardiac catheterization was done which showed no CAD  A Cardiac MRI was done which showed severe dilated ventricles and some fibrosis  It was felt his CM was consistent with subacute myocarditis  He was discharged on a Life Vest  On follow up 10/16 he was doing better  His EF was up to 40% on 10/16 echo  He had no decompensated HF symptoms  He sent LifeVEst back  I uptitrated meds, he quit smoking , but he was eating poorly  Had been terminated from job due to inability to do tasks  Admitted to [facility] 6/19/18 with CP and SOB  Stress negative for ischemia  EF < 20% and LVEDd 6 7 cm  Saw Dr Gerhard Patel in follow up and lisinopril changed to Entresto 24/26 mg BID  Interval History:  Repeat CMR done 7/23; other labs pending  Started Entresto and tolerating well       Past Medical History:   Diagnosis Date    CHF (congestive heart failure) ([facility])        Review of Systems - Doing ok, breathing ok, tolerating Entresto daily    Allergies   Allergen Reactions    Prednisone Shortness Of Breath and Edema       Current Outpatient Prescriptions:     aspirin (ECOTRIN LOW STRENGTH) 81 mg EC tablet, Take 1 tablet (81 mg total) by mouth daily, Disp: , Rfl: 0    atorvastatin (LIPITOR) 80 mg tablet, Take 1 tablet (80 mg total) by mouth daily, Disp: 90 tablet, Rfl: 3    furosemide (LASIX) 40 mg tablet, Take 1 tablet (40 mg total) by mouth 2 (two) times a day, Disp: 60 tablet, Rfl: 2    metoprolol succinate (TOPROL-XL) 100 mg 24 hr tablet, Take 1 5 tablets (150 mg total) by mouth daily, Disp: 45 tablet, Rfl: 5    sacubitril-valsartan (ENTRESTO) 24-26 MG TABS, Take 1 tablet by mouth 2 (two) times a day, Disp: 180 tablet, Rfl: 3    spironolactone (ALDACTONE) 25 mg tablet, Take 1 tablet (25 mg total) by mouth daily, Disp: 90 tablet, Rfl: 5    Social History     Social History    Marital status: /Civil Union     Spouse name: N/A    Number of children: N/A    Years of education: N/A     Occupational History    Not on file  Social History Main Topics    Smoking status: Former Smoker    Smokeless tobacco: Never Used    Alcohol use No    Drug use: No    Sexual activity: Not on file     Other Topics Concern    Not on file     Social History Narrative    No narrative on file       No family history on file      Physical Exam:    Vitals:   Vitals:    07/30/18 0820   BP: 106/70   Pulse: 81   SpO2: 96%     Wt Readings from Last 3 Encounters:   07/30/18 127 kg (281 lb)   07/23/18 122 kg (270 lb)   06/29/18 128 kg (283 lb)         Physical Exam:    GEN: Silvana Murray appears well, alert and oriented x 3, pleasant and cooperative   HEENT: pupils equal, round, and reactive to light; extraocular muscles intact  NECK: supple, no carotid bruits   HEART: regular rhythm, normal S1 and S2, no murmurs, clicks, gallops or rubs, JVP is    LUNGS: clear to auscultation bilaterally; no wheezes, rales, or rhonchi   ABDOMEN: normal bowel sounds, soft, no tenderness, no distention  EXTREMITIES: peripheral pulses normal; no clubbing, cyanosis, or edema  NEURO: no focal findings   SKIN: normal without suspicious lesions on exposed skin    Labs & Results:    Lab Results   Component Value Date    GLUCOSE 160 (H) 06/21/2018    CALCIUM 8 5 06/21/2018     (L) 06/21/2018    K 3 9 06/21/2018    CO2 27 06/21/2018     06/21/2018    BUN 20 06/21/2018    CREATININE 1 32 (H) 06/21/2018     Lab Results   Component Value Date    WBC 13 11 (H) 06/20/2018    HGB 15 9 06/20/2018    HCT 49 5 (H) 06/20/2018    MCV 90 06/20/2018     06/20/2018     Lab Results   Component Value Date    NTBNP 159 (H) 06/19/2018      Lab Results   Component Value Date    CHOL 186 06/20/2018    CHOL 189 03/25/2017    CHOL 121 06/17/2015     Lab Results   Component Value Date    HDL 32 (L) 06/20/2018    HDL 32 (L) 03/25/2017    HDL 30 06/17/2015     Lab Results   Component Value Date    LDLCALC 138 (H) 06/20/2018    LDLCALC 118 (H) 03/25/2017    LDLCALC 72 06/17/2015     Lab Results   Component Value Date    TRIG 78 06/20/2018    TRIG 197 (H) 03/25/2017    TRIG 95 06/17/2015     No components found for: CHOLHDL    EKG personally reviewed by Katlyn Emanuel DO  Counseling / Coordination of Care  Total floor / unit time spent today 25 minutes  Greater than 50% of total time was spent with the patient and / or family counseling and / or coordination of care  A description of the counseling / coordination of care: 15      Thank you for the opportunity to participate in the care of this patient    ASHLEY HOLLOWAY [address]

## 2018-07-30 NOTE — PATIENT INSTRUCTIONS
Please check daily weights and contact the heart failure program at [phone] if you gain 3 lb in one day or 5 lb in one week  Please limit daily sodium intake to 2000 mg daily  Please limit daily fluid intake to 2000 ml daily  Bring complete list of medications to your follow up appointment         No change in your meds    Echo in late September to look at your heart function

## 2018-08-06 ENCOUNTER — TELEPHONE (OUTPATIENT)
Dept: CARDIOLOGY CLINIC | Facility: CLINIC | Age: 38
End: 2018-08-06

## 2018-08-06 NOTE — TELEPHONE ENCOUNTER
Called and spoke with patient about sleep study results  Advised for follow up per Dr Jonah Lott     ===View-only below this line===    ----- Message -----  From: ROMINA Wilkes MD  Sent: 8/3/2018  10:35 AM  To: Brenda Lubin MA    Please call the patient and let him know that he has severe sleep apnea and would benefit from CPAP  Please followup and get the repeat sleep study for CPAP titration  Thanks!

## 2018-08-13 ENCOUNTER — TELEPHONE (OUTPATIENT)
Dept: SLEEP CENTER | Facility: CLINIC | Age: 38
End: 2018-08-13

## 2018-08-13 NOTE — TELEPHONE ENCOUNTER
Spoke with patient and given contact number for Dr Marika Shelton office to schedule a consult  Patient is aware of TYRON and need for treatment  He will call office to schedule appointment

## 2018-08-15 NOTE — PROGRESS NOTES
Called patient to discuss results of cMRI, PSG, genetic testing, and symptom check  He feels about the same as prior  He has to send in the genetic test still  He has severe TYRON and will f/u for CPAP titration  His cMRI was complicated by ectopy, but is c/w idiopathic CM

## 2018-08-22 ENCOUNTER — OFFICE VISIT (OUTPATIENT)
Dept: PULMONOLOGY | Facility: CLINIC | Age: 38
End: 2018-08-22
Payer: COMMERCIAL

## 2018-08-22 VITALS
RESPIRATION RATE: 18 BRPM | HEIGHT: 70 IN | WEIGHT: 281 LBS | HEART RATE: 102 BPM | BODY MASS INDEX: 40.23 KG/M2 | OXYGEN SATURATION: 98 % | SYSTOLIC BLOOD PRESSURE: 118 MMHG | DIASTOLIC BLOOD PRESSURE: 80 MMHG

## 2018-08-22 DIAGNOSIS — G47.33 OSA (OBSTRUCTIVE SLEEP APNEA): Primary | ICD-10-CM

## 2018-08-22 DIAGNOSIS — I42.0 CARDIOMYOPATHY, DILATED, NONISCHEMIC (HCC): ICD-10-CM

## 2018-08-22 DIAGNOSIS — E66.01 MORBID OBESITY (HCC): ICD-10-CM

## 2018-08-22 PROCEDURE — 99203 OFFICE O/P NEW LOW 30 MIN: CPT | Performed by: INTERNAL MEDICINE

## 2018-08-23 NOTE — PROGRESS NOTES
Assessment/Plan:     Diagnoses and all orders for this visit:    TYRON (obstructive sleep apnea)  -     CPAP Auto New DME    Morbid obesity (HCC)    Cardiomyopathy, dilated, nonischemic (HCC)        Obstructive sleep apnea severe, recent diagnostic study discussed with the patient with severe obstructive sleep apnea with an AHI of 56 3  Etiology pathogenesis of obstructive sleep apnea discussed in detail  Consequences of untreated sleep apnea discussed understands and verbalizes also given his extensive cardiac history understands and verbalizes  Various treatment options for obstructive sleep apnea discussed with weight loss, mandibular advancement device uvulopharyngoplasty and CPAP titration  Given the severity of his sleep apnea and the extensive cardiac history he would benefit from a CPAP titration study  Testing procedure was discussed  He states he does not want to go in lab for a titration study   Will order the auto CPAP for now, discussed the need for compliance with the CPAP machine  Discussed that if he does not respond well to the CPAP then he would likely have to go back for a CPAP/BiPAP titration study  Understands and verbalizes  He will start using the CPAP and that is any concerns he will call and see him back in 3 months with the CPAP download compliance  Cautioned against driving when sleepy  Recommend weight loss    Return in about 3 months (around 11/22/2018)  All questions are answered to the patient's satisfaction and understanding  He verbalizes understanding  He is encouraged to call with any further questions or concerns  Portions of the record may have been created with voice recognition software  Occasional wrong word or "sound a like" substitutions may have occurred due to the inherent limitations of voice recognition software  Read the chart carefully and recognize, using context, where substitutions have occurred      Electronically Signed by Suly Waldrop MD    ______________________________________________________________________    Chief Complaint:   Chief Complaint   Patient presents with    Sleep Apnea     Sleep study results        Patient ID: Steve Anderson is a 34 y o  y o  male has a past medical history of CHF (congestive heart failure) ([facility] [address] )  8/22/2018  Patient is a very pleasant 25-year-old gentleman with extensive cardiac history, referred for a diagnostic sleep study by his cardiologist  Here for evaluation for obstructive sleep apnea  His daily sleep schedule is suggestive better at around 10/30/11 he states  Asleep right away and he is out of bed at 6:30 AM  He has 4-5 nocturnal awakenings for nocturia, there is history of very loud snoring witnessed apneic spells stroke in and gasping for air  There are no symptoms related to restless legs  Occasional early morning headaches, no feelings of depression or anxiety  History of lack of concentration and short-term memory loss is present  Occupational/Exposure history:  Travel history:  Review of Systems   Constitutional: Positive for fatigue and unexpected weight change  Negative for appetite change, chills, diaphoresis and fever  HENT: Negative for congestion, ear discharge, ear pain, nosebleeds, postnasal drip, rhinorrhea, sinus pain, sore throat and voice change  Eyes: Negative for pain, discharge and visual disturbance  Respiratory: Negative for apnea, cough, choking, chest tightness, shortness of breath, wheezing and stridor  History of very loud snoring, witnessed apneic spells history of choking and gasping for air  Cardiovascular: Negative for chest pain, palpitations and leg swelling  Gastrointestinal: Negative for abdominal pain, blood in stool, constipation, diarrhea and vomiting  Endocrine: Negative for cold intolerance, heat intolerance, polydipsia, polyphagia and polyuria  Genitourinary: Negative for difficulty urinating and dysuria     Musculoskeletal: Negative for arthralgias and neck pain  Skin: Negative for pallor and rash  Allergic/Immunologic: Negative for environmental allergies and food allergies  Neurological: Negative for dizziness, speech difficulty, weakness and light-headedness  Hematological: Negative for adenopathy  Does not bruise/bleed easily  Psychiatric/Behavioral: Negative for agitation, confusion and sleep disturbance  The patient is not nervous/anxious  Social history: He reports that he quit smoking about 3 years ago  He has a 17 00 pack-year smoking history  He has never used smokeless tobacco  He reports that he does not drink alcohol or use drugs  Past surgical history:   Past Surgical History:   Procedure Laterality Date    CARDIAC CATHETERIZATION       Family history: No family history on file  There is no immunization history on file for this patient  Current Outpatient Prescriptions   Medication Sig Dispense Refill    aspirin (ECOTRIN LOW STRENGTH) 81 mg EC tablet Take 1 tablet (81 mg total) by mouth daily  0    atorvastatin (LIPITOR) 80 mg tablet Take 1 tablet (80 mg total) by mouth daily 90 tablet 3    furosemide (LASIX) 40 mg tablet Take 1 tablet (40 mg total) by mouth 2 (two) times a day 60 tablet 2    metoprolol succinate (TOPROL-XL) 100 mg 24 hr tablet Take 1 5 tablets (150 mg total) by mouth daily 45 tablet 5    sacubitril-valsartan (ENTRESTO) 24-26 MG TABS Take 1 tablet by mouth 2 (two) times a day 180 tablet 3    spironolactone (ALDACTONE) 25 mg tablet Take 1 tablet (25 mg total) by mouth daily 90 tablet 5     No current facility-administered medications for this visit  Allergies: Prednisone    Objective:  Vitals:    08/22/18 1203   BP: 118/80   BP Location: Left arm   Patient Position: Sitting   Cuff Size: Standard   Pulse: 102   Resp: 18   SpO2: 98%   Weight: 127 kg (281 lb)   Height: 5' 10" (1 778 m)   Oxygen Therapy  SpO2: 98 %      Wt Readings from Last 3 Encounters:   08/22/18 127 kg (281 lb) 07/30/18 127 kg (281 lb)   07/23/18 122 kg (270 lb)     Body mass index is 40 32 kg/m²  Physical Exam   Constitutional: He is oriented to person, place, and time  He appears well-developed and well-nourished  HENT:   Head: Normocephalic and atraumatic  Crowded oropharyngeal airways, Mallampati score 4   Eyes: EOM are normal  Pupils are equal, round, and reactive to light  Neck: Normal range of motion  Neck supple  Short and wide neck   Cardiovascular: Normal rate, regular rhythm and normal heart sounds  Pulmonary/Chest: Effort normal and breath sounds normal    Abdominal: Soft  Bowel sounds are normal    Musculoskeletal: Normal range of motion  Neurological: He is alert and oriented to person, place, and time  Skin: Skin is warm and dry  Psychiatric: He has a normal mood and affect   His behavior is normal             ESS: Total score: 12

## 2018-09-19 ENCOUNTER — APPOINTMENT (OUTPATIENT)
Dept: LAB | Facility: HOSPITAL | Age: 38
End: 2018-09-19
Payer: COMMERCIAL

## 2018-09-19 ENCOUNTER — TELEPHONE (OUTPATIENT)
Dept: CARDIOLOGY CLINIC | Facility: CLINIC | Age: 38
End: 2018-09-19

## 2018-09-19 DIAGNOSIS — I50.22 CHRONIC SYSTOLIC HEART FAILURE (HCC): ICD-10-CM

## 2018-09-19 LAB
FERRITIN SERPL-MCNC: 113 NG/ML (ref 8–388)
TSH SERPL DL<=0.05 MIU/L-ACNC: 1.02 UIU/ML (ref 0.36–3.74)

## 2018-09-19 PROCEDURE — 82728 ASSAY OF FERRITIN: CPT | Performed by: INTERNAL MEDICINE

## 2018-09-19 PROCEDURE — 86334 IMMUNOFIX E-PHORESIS SERUM: CPT | Performed by: PATHOLOGY

## 2018-09-19 PROCEDURE — 84165 PROTEIN E-PHORESIS SERUM: CPT

## 2018-09-19 PROCEDURE — 36415 COLL VENOUS BLD VENIPUNCTURE: CPT | Performed by: INTERNAL MEDICINE

## 2018-09-19 PROCEDURE — 86334 IMMUNOFIX E-PHORESIS SERUM: CPT

## 2018-09-19 PROCEDURE — 84443 ASSAY THYROID STIM HORMONE: CPT | Performed by: INTERNAL MEDICINE

## 2018-09-19 PROCEDURE — 84165 PROTEIN E-PHORESIS SERUM: CPT | Performed by: PATHOLOGY

## 2018-09-19 NOTE — TELEPHONE ENCOUNTER
Called the patient and inform them that the results are normal     ===View-only below this line===    ----- Message -----  From: Marcelina Dumont MD  Sent: 9/19/2018   1:48 PM  To: Tyler Mackey MA    Results are normal  Please notify the patient  Thank you!

## 2018-09-21 LAB
ALBUMIN SERPL ELPH-MCNC: 4.12 G/DL (ref 3.5–5)
ALBUMIN SERPL ELPH-MCNC: 54.9 % (ref 52–65)
ALPHA1 GLOB SERPL ELPH-MCNC: 0.28 G/DL (ref 0.1–0.4)
ALPHA1 GLOB SERPL ELPH-MCNC: 3.7 % (ref 2.5–5)
ALPHA2 GLOB SERPL ELPH-MCNC: 0.7 G/DL (ref 0.4–1.2)
ALPHA2 GLOB SERPL ELPH-MCNC: 9.3 % (ref 7–13)
BETA GLOB ABNORMAL SERPL ELPH-MCNC: 0.47 G/DL (ref 0.4–0.8)
BETA1 GLOB SERPL ELPH-MCNC: 6.2 % (ref 5–13)
BETA2 GLOB SERPL ELPH-MCNC: 8.3 % (ref 2–8)
BETA2+GAMMA GLOB SERPL ELPH-MCNC: 0.62 G/DL (ref 0.2–0.5)
GAMMA GLOB ABNORMAL SERPL ELPH-MCNC: 1.32 G/DL (ref 0.5–1.6)
GAMMA GLOB SERPL ELPH-MCNC: 17.6 % (ref 12–22)
IGG/ALB SER: 1.22 {RATIO} (ref 1.1–1.8)
INTERPRETATION UR IFE-IMP: NORMAL
PROT SERPL-MCNC: 7.5 G/DL (ref 6.4–8.2)

## 2018-09-25 ENCOUNTER — TELEPHONE (OUTPATIENT)
Dept: CARDIOLOGY CLINIC | Facility: CLINIC | Age: 38
End: 2018-09-25

## 2018-09-25 NOTE — TELEPHONE ENCOUNTER
Called patient about the lab results being normal  Left message to call us back,    ===View-only below this line===    ----- Message -----  From: Sienna Jose MD  Sent: 9/21/2018   6:30 PM  To: Kanu Alejo MA    Results are normal  Please notify the patient  Thank you!

## 2018-09-25 NOTE — TELEPHONE ENCOUNTER
Patient called back, told him about his lab result showing normal     ===View-only below this line===    ----- Message -----  From: Daja Paul MD  Sent: 9/21/2018   6:30 PM  To: Grace Warner MA    Results are normal  Please notify the patient  Thank you!

## 2018-09-28 DIAGNOSIS — G47.33 OSA (OBSTRUCTIVE SLEEP APNEA): Primary | ICD-10-CM

## 2018-10-03 ENCOUNTER — HOSPITAL ENCOUNTER (OUTPATIENT)
Dept: NON INVASIVE DIAGNOSTICS | Facility: CLINIC | Age: 38
Discharge: HOME | End: 2018-10-03
Payer: COMMERCIAL

## 2018-10-03 DIAGNOSIS — I50.22 CHRONIC SYSTOLIC CHF (CONGESTIVE HEART FAILURE), NYHA CLASS 3 (HCC): ICD-10-CM

## 2018-10-03 PROCEDURE — 93308 TTE F-UP OR LMTD: CPT

## 2018-10-05 PROCEDURE — 93308 TTE F-UP OR LMTD: CPT | Performed by: INTERNAL MEDICINE

## 2018-10-05 PROCEDURE — 93325 DOPPLER ECHO COLOR FLOW MAPG: CPT | Performed by: INTERNAL MEDICINE

## 2018-10-05 PROCEDURE — 93321 DOPPLER ECHO F-UP/LMTD STD: CPT | Performed by: INTERNAL MEDICINE

## 2018-10-17 ENCOUNTER — OFFICE VISIT (OUTPATIENT)
Dept: CARDIOLOGY CLINIC | Facility: CLINIC | Age: 38
End: 2018-10-17

## 2018-10-17 VITALS
BODY MASS INDEX: 39.86 KG/M2 | WEIGHT: 278.4 LBS | OXYGEN SATURATION: 97 % | SYSTOLIC BLOOD PRESSURE: 114 MMHG | DIASTOLIC BLOOD PRESSURE: 70 MMHG | HEIGHT: 70 IN | HEART RATE: 80 BPM

## 2018-10-17 DIAGNOSIS — I10 HTN (HYPERTENSION), BENIGN: ICD-10-CM

## 2018-10-17 DIAGNOSIS — E66.9 OBESITY (BMI 30.0-34.9): ICD-10-CM

## 2018-10-17 DIAGNOSIS — I50.22 CHRONIC SYSTOLIC CHF (CONGESTIVE HEART FAILURE), NYHA CLASS 3 (HCC): Primary | ICD-10-CM

## 2018-10-17 PROCEDURE — 99214 OFFICE O/P EST MOD 30 MIN: CPT | Performed by: INTERNAL MEDICINE

## 2018-10-17 NOTE — PATIENT INSTRUCTIONS
Refer to electrophysiology for evaluation for a certain type of ICD    We are filling out your disability    Follow up on getting your sleep apnea- you have severe sleep apnea

## 2018-10-17 NOTE — PROGRESS NOTES
Heart Failure Outpatient Progress Note - Tk Bella 34 y o  male MRN: [id number]    @ Encounter: [id number]      Assessment/Plan:    Patient Active Problem List    Diagnosis Date Noted    TYRON (obstructive sleep apnea)     Diabetes mellitus type 2 in obese ([facility] ) 06/21/2018    Class 2 obesity due to excess calories in adult 06/21/2018    Chest pain 06/19/2018    Chronic systolic heart failure ([facility] ) 06/19/2018    Cardiomyopathy, dilated, nonischemic (HCC) 03/25/2017    Chronic combined systolic and diastolic congestive heart failure ([facility] ) 03/25/2017    Angina of effort ([place] [id number] ) 03/25/2017    Incidental pulmonary nodule, > 3mm and < 8mm 03/25/2017    Fatty liver disease, nonalcoholic 66/20/2489    Exertional chest pain 03/24/2017    Elevated troponin 03/24/2017    Hyperglycemia 03/24/2017    Leukocytosis 03/24/2017     # Chronic systolic CHF, Stage C, NYHA 2-3  Etiology: NICM  Prior hx of LVEF <20% recovered to ~40-45% on medical therapy, now re-reduced  Unclear reason for re-crudescence  Patient states compliant with medications  No recent illness  No palpitations  ? Natural progression +/- stress induced +/- viral +/- genetic +/- other  Has had extensive workup in the past   Weight: 278 lbs- stable    Diag:  Echo 10/3/18: EF: 15-20%,   CMR 7/23/18: EF: 25%, LVEDd 72 mm, wall thickness 13 mm  Delayed PEREZ in basal to mide interventricular septum  --cMRI 6/23/2015: LVEF ~ 15%, LVIDd 6 7 cm, Mildly reduced RVSF  Mod MR  Intramyocardial fibrosis seen in the basal anteroseptal wall suggestive of an idiopathic cardiomyopathy   Given myocardial edema, this may suggest acute or subacute  Fibrosis of the right ventricular insertion point into the   interventricular septum, suggesting pulmonary hypertension     --TTE 6/17/2015: LVEF ~25%  --TTE 10/8/2015: LVEF ~40%  --TTE 4/7/2016: LVEF ~40-45%    Neurohormonal Blockade:  --Beta-Blocker: Continue metoprolol succinate 150 mg PO daily  --ACEi, ARB or ARNi: entresto 24/26 mg BID  --Aldosterone Receptor Blocker: Continue spironolactone 25 mg PO daily  --Diuretic: Continue lasix 40 mg PO BID; Sudden Cardiac Death Risk Reduction:  --ICD: Continue LifeVest as bridge to ICD vs recovery     Electrical Resynchronization:  --Candidacy for BiV device: Narrow QRS     Advanced Therapies: Will continue to monitor  Hx of recovery in the past  If does not recover, given age, would prefer OHT  He would need to lose a little weight prior  LVAD could be considered as bridge, but will need to evaluate RV function further prior to consideration       # HLD: Start Atorvastatin 80 mg PO daily  # HTN  # DMII  # Obesity, BMI ~40  # TYRON- 7/28/18 severe sleep apnea- does not yet have CPAP  # Fatty liver disease    TODAY'S PLAN:  No room for uptitration of meds  I explained to the patient that prophylactic ICD is recommended per ACC/AHA/HRS guidelines given EF is < 35% for greater than 3 months from time of initial diagnosis despite optimized medical therapy  ICD implantation is performed for the primary goal or reducing arrhythmogenic and total mortality as evidenced in multiple trials  When applicable, per guideline recommendations for CRT therapy, a BIV device will be implanted to provide ICD therapy as well as resynchronization to optimize LV myocardial synchrony and cardiac output  In this circumstance the patient was explained that not all patients will achieve desired response (improved functional capacity, NYHA class, decreased LVESd on echo)  'Non responder rate can approach 40% based on patients particular type of cardiomyopathy, level and location of scar and amount of electrical dyssynchrony  Consent for the procedure including procedural risks will be obtained by implanting electrophysiologist    Refer to EP for eval for S- ICD given his Age  --2g sodium diet  - Daily weights    HPI:   35 yo male who follows for [facility]   Initial hospitalization 6/17 to 6/24/15 with acute heart failure  CM likely due to subacute myocarditis  On 6/17/15 a 2 D echocardiogram showed LV was mildly to moderately dilated  Systolic function was severely reduced with an EF of 26%    A cardiac catheterization was done which showed no CAD  A Cardiac MRI was done which showed severe dilated ventricles and some fibrosis  It was felt his CM was consistent with subacute myocarditis  He was discharged on a Life Vest  On follow up 10/16 he was doing better  His EF was up to 40% on 10/16 echo  He had no decompensated HF symptoms  He sent LifeVEst back  I uptitrated meds, he quit smoking , but he was eating poorly  Had been terminated from job due to inability to do tasks  Ad[facility] to [facility]/19/18 with CP and SOB  Stress negative for ischemia  EF < 20% and LVEDd 6 7 cm  Saw Dr Cedrick tSein in follow up and lisinopril changed to Entresto 24/26 mg BID  Interval History:  Still does not have his CPAP    Past Medical History:   Diagnosis Date    CHF (congestive heart failure) ([facility])        Review of Systems - Doing ok, breathing ok, tolerating Entresto daily  Does not have his CPAP yet    Allergies   Allergen Reactions    Prednisone Shortness Of Breath and Edema           Current Outpatient Prescriptions:     aspirin (ECOTRIN LOW STRENGTH) 81 mg EC tablet, Take 1 tablet (81 mg total) by mouth daily, Disp: , Rfl: 0    atorvastatin (LIPITOR) 80 mg tablet, Take 1 tablet (80 mg total) by mouth daily, Disp: 90 tablet, Rfl: 3    furosemide (LASIX) 40 mg tablet, Take 1 tablet (40 mg total) by mouth 2 (two) times a day, Disp: 60 tablet, Rfl: 2    metoprolol succinate (TOPROL-XL) 100 mg 24 hr tablet, Take 1 5 tablets (150 mg total) by mouth daily, Disp: 45 tablet, Rfl: 5    sacubitril-valsartan (ENTRESTO) 24-26 MG TABS, Take 1 tablet by mouth 2 (two) times a day, Disp: 180 tablet, Rfl: 3    spironolactone (ALDACTONE) 25 mg tablet, Take 1 tablet (25 mg total) by mouth daily, Disp: 90 tablet, Rfl: 5    Social History     Social History    Marital status: /Civil Union     Spouse name: N/A    Number of children: N/A    Years of education: N/A     Occupational History    Not on file  Social History Main Topics    Smoking status: Former Smoker     Packs/day: 1 00     Years: 17 00     Quit date: 2015    Smokeless tobacco: Never Used    Alcohol use No      Comment: rare    Drug use: No    Sexual activity: Not on file     Other Topics Concern    Not on file     Social History Narrative    No narrative on file       No family history on file      Physical Exam:    Vitals:   Vitals:    10/17/18 1452   BP: 114/70   Pulse: 80   SpO2: 97%     Wt Readings from Last 3 Encounters:   10/17/18 126 kg (278 lb 6 4 oz)   08/22/18 127 kg (281 lb)   07/30/18 127 kg (281 lb)       Physical Exam:    GEN: Joselito Said appears well, alert and oriented x 3, pleasant and cooperative   HEENT: pupils equal, round, and reactive to light; extraocular muscles intact  NECK: supple, no carotid bruits   HEART: regular rhythm, normal S1 and S2, no murmurs, clicks, gallops or rubs, JVP is    LUNGS: clear to auscultation bilaterally; no wheezes, rales, or rhonchi   ABDOMEN: normal bowel sounds, soft, no tenderness, no distention  EXTREMITIES: peripheral pulses normal; no clubbing, cyanosis, or edema  NEURO: no focal findings   SKIN: normal without suspicious lesions on exposed skin    Labs & Results:    Lab Results   Component Value Date    GLUCOSE 98 08/06/2015    CALCIUM 8 5 06/21/2018     (L) 06/21/2018    K 3 9 06/21/2018    CO2 27 06/21/2018     06/21/2018    BUN 20 06/21/2018    CREATININE 1 32 (H) 06/21/2018     Lab Results   Component Value Date    WBC 13 11 (H) 06/20/2018    HGB 15 9 06/20/2018    HCT 49 5 (H) 06/20/2018    MCV 90 06/20/2018     06/20/2018     Lab Results   Component Value Date    NTBNP 159 (H) 06/19/2018      Lab Results   Component Value Date    CHOL 121 06/17/2015     Lab Results   Component Value Date HDL 32 (L) 06/20/2018    HDL 32 (L) 03/25/2017    HDL 30 06/17/2015     Lab Results   Component Value Date    LDLCALC 138 (H) 06/20/2018    LDLCALC 118 (H) 03/25/2017    LDLCALC 72 06/17/2015     Lab Results   Component Value Date    TRIG 78 06/20/2018    TRIG 197 (H) 03/25/2017    TRIG 95 06/17/2015     No components found for: CHOLHDL    EKG personally reviewed by Reyna Escobedo DO  Counseling / Coordination of Care  Total floor / unit time spent today 25 minutes  Greater than 50% of total time was spent with the patient and / or family counseling and / or coordination of care  A description of the counseling / coordination of care: 15      Thank you for the opportunity to participate in the care of this patient    ASHLEY HOLLOWAY [address]

## 2018-10-30 ENCOUNTER — OFFICE VISIT (OUTPATIENT)
Dept: CARDIOLOGY CLINIC | Facility: CLINIC | Age: 38
End: 2018-10-30
Payer: COMMERCIAL

## 2018-10-30 VITALS
WEIGHT: 279.8 LBS | HEART RATE: 65 BPM | HEIGHT: 70 IN | SYSTOLIC BLOOD PRESSURE: 132 MMHG | BODY MASS INDEX: 40.06 KG/M2 | DIASTOLIC BLOOD PRESSURE: 78 MMHG

## 2018-10-30 DIAGNOSIS — I50.22 CHRONIC SYSTOLIC HF (HEART FAILURE) (HCC): Primary | ICD-10-CM

## 2018-10-30 PROCEDURE — 99215 OFFICE O/P EST HI 40 MIN: CPT | Performed by: PHYSICIAN ASSISTANT

## 2018-10-30 PROCEDURE — 93000 ELECTROCARDIOGRAM COMPLETE: CPT | Performed by: PHYSICIAN ASSISTANT

## 2018-10-31 NOTE — PROGRESS NOTES
Cardiology Follow Up    Joselito Yang  [birth year]  [id number]  HEART & VASCULAR [facility] [place]  [address]        Assessment/Plan     1  Chronic systolic HF (heart failure) (HCC)  POCT ECG    Cardiac icd implant     1  Idiopathic CMP    * EF remains 15% despite >90days of GDMT with entresto, Toprol XL and aldactone    * he is currently wearing a lifevest    * all studies do not identify cause of his depressed EF as his stress tests have been negative    * he absolutely has a class 1 indication for a single chamber ICD as he has no pacing needs    * we did discuss SQ ICD vs SC ICD with decision to proceed with traditional subclavian single chamber ICD system due to its ability to perform ATP  This was also discussed between patient and Dr Polly Gauthier    * we will arrange for this to be done ASAP so he can stop wearing his lifevest    * no medication holds prior    * educated patient on post ICD site care and f/u instructions to which he is agreeable    * continuing to follow with Dr Samantha Landers, he is being considered for advanced CHF with LVAD or OHT  History of Present Illness     HPI/INTERVAL HISTORY: Joselito Yang is a 34 y o  male with a history of idiopathic cardiomyopathy EF 11%, chronic systolic CHF EF 22% who presents to the EP office today for ICD consultation  He was referred to us by Dr Cam Owens  Patient was diagnosed with an idiopathic cardiomyopathy in 2015 w/ an EF of 25%  He underwent extensive workup without any causative agents for his depressed EF  He was started on GDMT with ACEI and BB with an improvement of his EF to 40-45% in 2016  He remained in this EF zone until June of 2018 when he was hospitalized for SOB  During his hospitalization he was found to have an EF of 15%  He again underwent extensive workup including cardiac MRI which showed findings of an idiopathic cardiomyopathy and a stress test showing no ischemia   At discharge he was placed on a lifevest with his medications being switched to include entresto w/ continued use of toprol XL as well as aldactone  Despite this medications his EF remains at 15%  Due to this he was referred to us for ICD evaluation  Patient currently has been compliant with all medications and his lifevest  He is not working currently but is staying home to take care of his 3and 11year old children while his wife is currently working  He used to work as a   He denies any family hx of SCD, CHF, CAD  He has no personal hx of syncope  No hospitalizations for VT/VF  Denies any alcohol use or illicit drug use  No OTC supplements or medications  He does have sleep apnea for which he is obtaining a CPAP in the near future  Review of Systems  ROS as noted above, otherwise 12 point review of systems was performed and is negative  Historical Information   Social History     Social History    Marital status: /Civil Union     Spouse name: N/A    Number of children: N/A    Years of education: N/A     Occupational History    Not on file  Social History Main Topics    Smoking status: Former Smoker     Packs/day: 1 00     Years: 17 00     Quit date: 2015    Smokeless tobacco: Never Used    Alcohol use No      Comment: rare    Drug use: No    Sexual activity: Not on file     Other Topics Concern    Not on file     Social History Narrative    No narrative on file     Past Medical History:   Diagnosis Date    CHF (congestive heart failure) ([facility] [address] )      Past Surgical History:   Procedure Laterality Date    CARDIAC CATHETERIZATION       History   Alcohol Use No     Comment: rare     History   Drug Use No     History   Smoking Status    Former Smoker    Packs/day: 1 00    Years: 17 00    Quit date: 2015   Smokeless Tobacco    Never Used     History reviewed  No pertinent family history      Meds/Allergies       Current Outpatient Prescriptions:     aspirin (ECOTRIN LOW STRENGTH) 81 mg EC tablet, Take 1 tablet (81 mg total) by mouth daily, Disp: , Rfl: 0    atorvastatin (LIPITOR) 80 mg tablet, Take 1 tablet (80 mg total) by mouth daily, Disp: 90 tablet, Rfl: 3    furosemide (LASIX) 40 mg tablet, Take 1 tablet (40 mg total) by mouth 2 (two) times a day, Disp: 60 tablet, Rfl: 2    metoprolol succinate (TOPROL-XL) 100 mg 24 hr tablet, Take 1 5 tablets (150 mg total) by mouth daily, Disp: 45 tablet, Rfl: 5    sacubitril-valsartan (ENTRESTO) 24-26 MG TABS, Take 1 tablet by mouth 2 (two) times a day, Disp: 180 tablet, Rfl: 3    spironolactone (ALDACTONE) 25 mg tablet, Take 1 tablet (25 mg total) by mouth daily, Disp: 90 tablet, Rfl: 5    Allergies   Allergen Reactions    Prednisone Shortness Of Breath and Edema       Objective   Vitals: Blood pressure 132/78, pulse 65, height 5' 10" (1 778 m), weight 127 kg (279 lb 12 8 oz)  Physical Exam   Constitutional: He is oriented to person, place, and time  He appears well-developed and well-nourished  HENT:   Head: Normocephalic and atraumatic  Eyes: Pupils are equal, round, and reactive to light  EOM are normal    Neck: Normal range of motion  Neck supple  Cardiovascular: Normal rate and regular rhythm  Pulmonary/Chest: Effort normal and breath sounds normal    Abdominal: Soft  Bowel sounds are normal    Musculoskeletal: Normal range of motion  Neurological: He is alert and oriented to person, place, and time  Skin: Skin is warm and dry  Psychiatric: He has a normal mood and affect  Labs:not applicable    Imaging: I have personally reviewed pertinent reports        ECHO:   Results for orders placed during the hospital encounter of 06/19/18   Echo complete with contrast if indicated    Narrative [address] [place] [id number] [phone]    Transthoracic Echocardiogram  2D, M-mode, Doppler, and Color Doppler    Study date:  20-Jun-2018    Patient: Gavino Reagan [facility]  MR number: [id number]  Account number: [de-identified]  : 1980  Age: 42 years  Gender: Male  Status: Outpatient  Location: Bedside  Height: 70 in  Weight: 250 lb  BP: 127/ 68 mmHg    Indications: Acute Myocardial Infarction    Diagnoses: 410 80 - AMI NEC, UNSPECIFIED    Sonographer:  ANA Guzmán,RDCS  Interpreting Physician:  Nasir Sharif MD  Referring Physician:  Nghia Partida DO  Group:  [facility]    SUMMARY    LEFT VENTRICLE:  The ventricle was moderately dilated  Ejection fraction was estimated to be 15 %  There was severe diffuse hypokinesis  LEFT ATRIUM:  The atrium was mildly to moderately dilated  MITRAL VALVE:  There was moderate regurgitation  TRICUSPID VALVE:  There was trace regurgitation  PULMONIC VALVE:  There was trace regurgitation  HISTORY: PRIOR HISTORY: Chest Pain, Elevated Troponins, Cardiomyopathy, Congestive Heart Failure    PROCEDURE: The procedure was performed at the bedside  This was a routine study  The transthoracic approach was used  The study included complete 2D imaging, M-mode, complete spectral Doppler, and color Doppler  The heart rate was 91 bpm,  at the start of the study  Images were obtained from the parasternal, apical, subcostal, and suprasternal notch acoustic windows  Intravenous contrast ( 0 6ml Definity used in NSS) was administered to opacify the left ventricle  Image  quality was adequate  LEFT VENTRICLE: The ventricle was moderately dilated  Ejection fraction was estimated to be 15 %  There was severe diffuse hypokinesis  RIGHT VENTRICLE: The size was normal  Systolic function was normal  Wall thickness was normal     LEFT ATRIUM: The atrium was mildly to moderately dilated  RIGHT ATRIUM: Size was normal     MITRAL VALVE: Valve structure was normal  There was normal leaflet separation  DOPPLER: The transmitral velocity was within the normal range  There was no evidence for stenosis   There was moderate regurgitation  AORTIC VALVE: The valve was trileaflet  Leaflets exhibited normal thickness and normal cuspal separation  DOPPLER: Transaortic velocity was within the normal range  There was no evidence for stenosis  There was no regurgitation  TRICUSPID VALVE: The valve structure was normal  There was normal leaflet separation  DOPPLER: The transtricuspid velocity was within the normal range  There was no evidence for stenosis  There was trace regurgitation  PULMONIC VALVE: Leaflets exhibited normal thickness, no calcification, and normal cuspal separation  DOPPLER: The transpulmonic velocity was within the normal range  There was trace regurgitation  PERICARDIUM: There was no pericardial effusion  The pericardium was normal in appearance  AORTA: The root exhibited normal size  SYSTEM MEASUREMENT TABLES    2D  %FS: 7 8 %  Ao Diam: 2 8 cm  EDV(Teich): 232 9 ml  EF(Teich): 16 8 %  ESV(Teich): 193 8 ml  IVSd: 0 8 cm  LA Area: 24 5 cm2  LA Diam: 5 cm  LVEDV MOD A4C: 334 7 ml  LVEF MOD A4C: 37 8 %  LVESV MOD A4C: 208 4 ml  LVIDd: 6 7 cm  LVIDs: 6 2 cm  LVLd A4C: 11 3 cm  LVLs A4C: 11 1 cm  LVPWd: 1 1 cm  RA Area: 16 5 cm2  RVIDd: 2 9 cm  SV MOD A4C: 126 4 ml  SV(Teich): 39 1 ml    CW  MR VTI: 147 9 cm  MR Vmax: 5 m/s  MR Vmean: 3 7 m/s  MR maxP 1 mmHg  MR meanP 6 mmHg  TR Vmax: 2 6 m/s  TR maxP 1 mmHg    MM  TAPSE: 1 8 cm    PW  MV A Robin: 0 7 m/s  MV Dec [place]: 11 4 m/s2  MV DecT: 86 8 ms  MV E Robin: 1 m/s  MV E/A Ratio: 1 5  MV PHT: 25 2 ms  MVA By PHT: 8 7 cm2    [employer] Accredited Echocardiography Laboratory    Prepared and electronically signed by    Sam Nielsen MD  Signed 2018 16:08:19         CATH/STRESS TEST (2018):   SUMMARY:  -  Stress results: There was no chest pain during stress  -  ECG conclusions: The stress ECG was non-diagnostic due to resting ST/T wave abnormality   -  Perfusion imaging:  The left ventricle was severely dilated  -  Gated SPECT: The calculated left ventricular ejection fraction was 17 %  Left ventricular ejection fraction was markedly decreased by visual estimate  LVEF 20%  There was severe global left ventricular hypokinesis  Cardiac MRI:   Findings:    1  Technically difficult study due to presence of ectopy  Unable to accurately measure left ventricular ejection fraction or volumes  2  The left ventricle is severely dilated with mild concentric left ventricular hypertrophy  Left ventricular systolic function is severely reduced with an estimated ejection fraction of 25%  There is severe global hypokinesis with dyskinesis of the   basal to mid interventricular septum  3  The right ventricle is normal in size with normal right ventricular systolic function  4  The aortic, mitral, and tricuspid valves open without restriction  There is no significant valvular regurgitation, however cine MRI is inaccurate in the qualitative assessment of valvular regurgitation  5  The left atrium is mildly dilated  The aortic root is normal in size  5  There is no evidence of myocardial edema  6  Delayed post-gadolinium imaging demonstrates intramyocardial hyperenhancement of the basal to mid interventricular septum      IMPRESSION:  Impression:  1  Severely dilated left ventricle with severely reduced left ventricular systolic function  2  Normal right ventricular size and systolic function  3  No significant valvular abnormalities  4  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

## 2018-10-31 NOTE — PATIENT INSTRUCTIONS
1  Continue lifevest   2  Continue all medications   3   Arrange for single chamber ICD implant ASAP

## 2018-11-02 DIAGNOSIS — I42.9 CARDIOMYOPATHY, UNSPECIFIED TYPE (HCC): ICD-10-CM

## 2018-11-02 RX ORDER — METOPROLOL SUCCINATE 100 MG/1
TABLET, EXTENDED RELEASE ORAL
Qty: 45 TABLET | Refills: 5 | Status: SHIPPED | OUTPATIENT
Start: 2018-11-02 | End: 2019-05-20 | Stop reason: SDUPTHER

## 2018-11-11 ENCOUNTER — TELEPHONE (OUTPATIENT)
Dept: INPATIENT UNIT | Facility: HOSPITAL | Age: 38
End: 2018-11-11

## 2018-11-12 ENCOUNTER — HOSPITAL ENCOUNTER (OUTPATIENT)
Dept: NON INVASIVE DIAGNOSTICS | Facility: HOSPITAL | Age: 38
Discharge: HOME | End: 2018-11-13
Attending: INTERNAL MEDICINE | Admitting: INTERNAL MEDICINE
Payer: COMMERCIAL

## 2018-11-12 ENCOUNTER — APPOINTMENT (OUTPATIENT)
Dept: RADIOLOGY | Facility: HOSPITAL | Age: 38
End: 2018-11-12
Payer: COMMERCIAL

## 2018-11-12 ENCOUNTER — ANESTHESIA (OUTPATIENT)
Dept: SURGERY | Facility: HOSPITAL | Age: 38
End: 2018-11-12
Payer: COMMERCIAL

## 2018-11-12 ENCOUNTER — ANESTHESIA EVENT (OUTPATIENT)
Dept: SURGERY | Facility: HOSPITAL | Age: 38
End: 2018-11-12
Payer: COMMERCIAL

## 2018-11-12 DIAGNOSIS — I42.0 CARDIOMYOPATHY, DILATED, NONISCHEMIC (HCC): Primary | ICD-10-CM

## 2018-11-12 DIAGNOSIS — I50.22 CHRONIC SYSTOLIC HF (HEART FAILURE) (HCC): ICD-10-CM

## 2018-11-12 LAB
ANION GAP SERPL CALCULATED.3IONS-SCNC: 4 MMOL/L (ref 4–13)
ATRIAL RATE: 69 BPM
ATRIAL RATE: 82 BPM
BUN SERPL-MCNC: 16 MG/DL (ref 5–25)
CALCIUM SERPL-MCNC: 8.6 MG/DL (ref 8.3–10.1)
CHLORIDE SERPL-SCNC: 102 MMOL/L (ref 100–108)
CO2 SERPL-SCNC: 29 MMOL/L (ref 21–32)
CREAT SERPL-MCNC: 1.25 MG/DL (ref 0.6–1.3)
ERYTHROCYTE [DISTWIDTH] IN BLOOD BY AUTOMATED COUNT: 12.1 % (ref 11.6–15.1)
GFR SERPL CREATININE-BSD FRML MDRD: 72 ML/MIN/1.73SQ M
GLUCOSE P FAST SERPL-MCNC: 108 MG/DL (ref 65–99)
GLUCOSE SERPL-MCNC: 108 MG/DL (ref 65–140)
HCT VFR BLD AUTO: 53.3 % (ref 36.5–49.3)
HGB BLD-MCNC: 16.6 G/DL (ref 12–17)
INR PPP: 1.05 (ref 0.86–1.17)
MCH RBC QN AUTO: 28.5 PG (ref 26.8–34.3)
MCHC RBC AUTO-ENTMCNC: 31.1 G/DL (ref 31.4–37.4)
MCV RBC AUTO: 92 FL (ref 82–98)
P AXIS: 35 DEGREES
P AXIS: 55 DEGREES
PLATELET # BLD AUTO: 250 THOUSANDS/UL (ref 149–390)
PMV BLD AUTO: 11.2 FL (ref 8.9–12.7)
POTASSIUM SERPL-SCNC: 4 MMOL/L (ref 3.5–5.3)
PR INTERVAL: 148 MS
PR INTERVAL: 158 MS
PROTHROMBIN TIME: 13.8 SECONDS (ref 11.8–14.2)
QRS AXIS: -13 DEGREES
QRS AXIS: -15 DEGREES
QRSD INTERVAL: 104 MS
QRSD INTERVAL: 112 MS
QT INTERVAL: 374 MS
QT INTERVAL: 408 MS
QTC INTERVAL: 436 MS
QTC INTERVAL: 437 MS
RBC # BLD AUTO: 5.82 MILLION/UL (ref 3.88–5.62)
SODIUM SERPL-SCNC: 135 MMOL/L (ref 136–145)
T WAVE AXIS: -3 DEGREES
T WAVE AXIS: 23 DEGREES
VENTRICULAR RATE: 69 BPM
VENTRICULAR RATE: 82 BPM
WBC # BLD AUTO: 11.02 THOUSAND/UL (ref 4.31–10.16)

## 2018-11-12 PROCEDURE — 93005 ELECTROCARDIOGRAM TRACING: CPT

## 2018-11-12 PROCEDURE — 93010 ELECTROCARDIOGRAM REPORT: CPT | Performed by: INTERNAL MEDICINE

## 2018-11-12 PROCEDURE — C1892 INTRO/SHEATH,FIXED,PEEL-AWAY: HCPCS | Performed by: PHYSICIAN ASSISTANT

## 2018-11-12 PROCEDURE — 80048 BASIC METABOLIC PNL TOTAL CA: CPT | Performed by: PHYSICIAN ASSISTANT

## 2018-11-12 PROCEDURE — 85027 COMPLETE CBC AUTOMATED: CPT | Performed by: PHYSICIAN ASSISTANT

## 2018-11-12 PROCEDURE — 33249 INSJ/RPLCMT DEFIB W/LEAD(S): CPT | Performed by: PHYSICIAN ASSISTANT

## 2018-11-12 PROCEDURE — 71045 X-RAY EXAM CHEST 1 VIEW: CPT

## 2018-11-12 PROCEDURE — 85610 PROTHROMBIN TIME: CPT | Performed by: PHYSICIAN ASSISTANT

## 2018-11-12 PROCEDURE — C1777 LEAD, AICD, ENDO SINGLE COIL: HCPCS

## 2018-11-12 PROCEDURE — 33249 INSJ/RPLCMT DEFIB W/LEAD(S): CPT | Performed by: INTERNAL MEDICINE

## 2018-11-12 RX ORDER — ACETAMINOPHEN 325 MG/1
650 TABLET ORAL EVERY 4 HOURS PRN
Status: DISCONTINUED | OUTPATIENT
Start: 2018-11-12 | End: 2018-11-13 | Stop reason: HOSPADM

## 2018-11-12 RX ORDER — KETAMINE HYDROCHLORIDE 50 MG/ML
INJECTION, SOLUTION, CONCENTRATE INTRAMUSCULAR; INTRAVENOUS AS NEEDED
Status: DISCONTINUED | OUTPATIENT
Start: 2018-11-12 | End: 2018-11-12 | Stop reason: SURG

## 2018-11-12 RX ORDER — GENTAMICIN SULFATE 40 MG/ML
INJECTION, SOLUTION INTRAMUSCULAR; INTRAVENOUS CODE/TRAUMA/SEDATION MEDICATION
Status: COMPLETED | OUTPATIENT
Start: 2018-11-12 | End: 2018-11-12

## 2018-11-12 RX ORDER — SODIUM CHLORIDE 9 MG/ML
INJECTION, SOLUTION INTRAVENOUS CONTINUOUS PRN
Status: DISCONTINUED | OUTPATIENT
Start: 2018-11-12 | End: 2018-11-12 | Stop reason: SURG

## 2018-11-12 RX ORDER — ATORVASTATIN CALCIUM 80 MG/1
80 TABLET, FILM COATED ORAL DAILY
Status: DISCONTINUED | OUTPATIENT
Start: 2018-11-12 | End: 2018-11-13 | Stop reason: HOSPADM

## 2018-11-12 RX ORDER — GLYCOPYRROLATE 0.2 MG/ML
INJECTION INTRAMUSCULAR; INTRAVENOUS AS NEEDED
Status: DISCONTINUED | OUTPATIENT
Start: 2018-11-12 | End: 2018-11-12 | Stop reason: SURG

## 2018-11-12 RX ORDER — PROPOFOL 10 MG/ML
INJECTION, EMULSION INTRAVENOUS AS NEEDED
Status: DISCONTINUED | OUTPATIENT
Start: 2018-11-12 | End: 2018-11-12 | Stop reason: SURG

## 2018-11-12 RX ORDER — SPIRONOLACTONE 25 MG/1
25 TABLET ORAL DAILY
Status: DISCONTINUED | OUTPATIENT
Start: 2018-11-12 | End: 2018-11-13 | Stop reason: HOSPADM

## 2018-11-12 RX ORDER — PROPOFOL 10 MG/ML
INJECTION, EMULSION INTRAVENOUS CONTINUOUS PRN
Status: DISCONTINUED | OUTPATIENT
Start: 2018-11-12 | End: 2018-11-12 | Stop reason: SURG

## 2018-11-12 RX ORDER — ASPIRIN 81 MG/1
81 TABLET ORAL DAILY
Status: DISCONTINUED | OUTPATIENT
Start: 2018-11-12 | End: 2018-11-13 | Stop reason: HOSPADM

## 2018-11-12 RX ORDER — FUROSEMIDE 40 MG/1
40 TABLET ORAL 2 TIMES DAILY
Status: DISCONTINUED | OUTPATIENT
Start: 2018-11-12 | End: 2018-11-13 | Stop reason: HOSPADM

## 2018-11-12 RX ORDER — LIDOCAINE HYDROCHLORIDE 10 MG/ML
INJECTION, SOLUTION INFILTRATION; PERINEURAL CODE/TRAUMA/SEDATION MEDICATION
Status: COMPLETED | OUTPATIENT
Start: 2018-11-12 | End: 2018-11-12

## 2018-11-12 RX ORDER — OXYCODONE HYDROCHLORIDE AND ACETAMINOPHEN 5; 325 MG/1; MG/1
1 TABLET ORAL EVERY 8 HOURS PRN
Status: DISCONTINUED | OUTPATIENT
Start: 2018-11-12 | End: 2018-11-13 | Stop reason: HOSPADM

## 2018-11-12 RX ADMIN — ACETAMINOPHEN 650 MG: 325 TABLET ORAL at 19:35

## 2018-11-12 RX ADMIN — FUROSEMIDE 40 MG: 40 TABLET ORAL at 19:35

## 2018-11-12 RX ADMIN — CEFAZOLIN SODIUM 3000 MG: 2 SOLUTION INTRAVENOUS at 08:45

## 2018-11-12 RX ADMIN — LIDOCAINE HYDROCHLORIDE 40 ML: 10 INJECTION, SOLUTION INFILTRATION; PERINEURAL at 09:02

## 2018-11-12 RX ADMIN — PROPOFOL 50 MCG/KG/MIN: 10 INJECTION, EMULSION INTRAVENOUS at 08:50

## 2018-11-12 RX ADMIN — ACETAMINOPHEN 650 MG: 325 TABLET ORAL at 13:54

## 2018-11-12 RX ADMIN — KETAMINE HYDROCHLORIDE 10 MG: 50 INJECTION, SOLUTION INTRAMUSCULAR; INTRAVENOUS at 08:59

## 2018-11-12 RX ADMIN — GENTAMICIN SULFATE 80 MG: 40 INJECTION, SOLUTION INTRAMUSCULAR; INTRAVENOUS at 09:21

## 2018-11-12 RX ADMIN — PROPOFOL 50 MG: 10 INJECTION, EMULSION INTRAVENOUS at 08:43

## 2018-11-12 RX ADMIN — GLYCOPYRROLATE 0.15 MG: 0.2 INJECTION, SOLUTION INTRAMUSCULAR; INTRAVENOUS at 08:43

## 2018-11-12 RX ADMIN — OXYCODONE HYDROCHLORIDE AND ACETAMINOPHEN 1 TABLET: 5; 325 TABLET ORAL at 19:57

## 2018-11-12 RX ADMIN — KETAMINE HYDROCHLORIDE 40 MG: 50 INJECTION, SOLUTION INTRAMUSCULAR; INTRAVENOUS at 08:43

## 2018-11-12 RX ADMIN — SODIUM CHLORIDE: 0.9 INJECTION, SOLUTION INTRAVENOUS at 07:25

## 2018-11-12 RX ADMIN — IOHEXOL 10 ML: 350 INJECTION, SOLUTION INTRAVENOUS at 09:12

## 2018-11-12 NOTE — OP NOTE
ELECTROPHYSIOLOGY  OPERATIVE REPORT  PATIENT NAME: Emilia Miller  :  1888  MRN: [id number]  Date of surgery: 18  Surgeon: Refugio Dewey DO Pt Location: Cath Lab    PROCEDURE PERFORMED:   1)ICD Implantable Cardioverter Defibrillator    Preoperative Medications: ancef 3 grams  ANESTHESIA: general    NCDR ICD REGISTRY INFO    Heart Failure: Yes  NYHA Functional Classification: Class III  Ischemic Cardiomyopathy: No   Non-Ischemic Cardiomyopathy: Yes  Timeframe: 0 >= 3 Months  Guideline Directed Medical Therapy Maximum Dose - Yes (for 3 Months)  Ventricular Tachycardia: No   Indications for Permanent Pacemaker: No   ICD Indication: Primary Prevention  PREOPERATIVE DIAGNOSIS:   1  Chronic Congestive Heart Failure with Systolic Dysfunction EF of less than 20% >3 months with on optimal medical therapy including betablocker and ACE/ARB (unless contraindicated) and NYHAIII symptoms        POSTOPERATIVE DIAGNOSIS: Successful Single chamber AICD implant  Same as Preop  Informed Consent: Risks, benefits, and alternatives discussed with patient and any family present  He understands risks, which include but are not limited to life threatening  bleeding, infection, air around lungs, blood around the heart and reoperation dislodged or malfunctioning device  Procedure Description:  After informed consent was obtained, the patient was brought to the electrophysiology laboratory in the post-absorptive state  A time out was called and the patient was properly identified  The patient was pre-medicated as above  The left infraclavicular area was prepped and draped in the usual sterile fashion  After local anesthetic infiltration with 1% lidocaine, an incision was made below the left clavicle  The incision was extended down to the level of the pectoral fascia  A pocket was formed above the pectoral fascia using cautery and blunt dissection  axillary approach was done   A safe sheath introducer was advanced over a wire into the axillary vein, the wire was confirmed to be in the right atrium on flouroscopy, the wire was removed  Through the introducer the ICD lead was passed into the right heart  Under fluoroscopic guidance the right ventricular lead was positioned on the right ventricular septum  After satisfactory ventricular sensing and pacing thresholds were confirmed, the lead was sewn to the pectoral fascia/muscle using 0 silk sutures  The lead and pulse generator were placed in the pre-pectoral pocket  The pocket was then closed with 3 layers of 2-0, 3-0, 4-0 -Vicryl suture was used to close the skin  EBL: Minimal  Complications: None  FQDLBNWQ:48 cc  Findings: enlarged heart     The patient tolerated the procedure well  Plan: Routine postoperative care, CXR, and overnight observation with telemetry  Follow-up in 2 weeks with incision check and interrogation

## 2018-11-12 NOTE — INTERVAL H&P NOTE
Please refer to recent office note by Rodrigue Rodrigues PA-C for full details  Briefly this patient is a 49-year-old male with history of nonischemic cardiomyopathy with reduced ejection fraction 15% despite optimal medical therapy with Entresto and Toprol-XL, hyperlipidemia, obesity with BMI 39, and obstructive sleep apnea  He typically follows with Dr Meera Nielsen as an outpatient  Given his persistently depressed ejection fraction, an ICD was recommended and he presents to undergo that procedure  No significant changes since he was last seen, accept physical exam as above

## 2018-11-12 NOTE — ANESTHESIA POSTPROCEDURE EVALUATION
Post-Op Assessment Note      CV Status:  Stable    Mental Status:  Alert and awake    Hydration Status:  Euvolemic    PONV Controlled:  Controlled    Airway Patency:  Patent and adequate    Post Op Vitals Reviewed: Yes          Staff: CRNA           BP   123/57   Temp      Pulse  78   Resp      SpO2   96%

## 2018-11-12 NOTE — DISCHARGE INSTRUCTIONS
Please refer to post ICD implantation discharge instructions and restrictions below and your ICD booklet/temporary card  Keep incision dry for one week  Do not use lotions/powders/creams on incision  Leave outer bandage in place for 1 week - it is water proof, and as long as it is fully adhered to your skin you may shower with it  If it appears as though the bandage is coming off and/or there is any communication to the area of device incision, please then keep the whole area dry for the remaining week  After 1 week, please remove by pulling all edges away from the center of the bandage  No overhead reaching/pushing/pulling/lifting greater than 5-10lbs with left arm for one month  Please call the office if you notice redness, swelling, bleeding, or drainage from incision or if you develop fevers    Implantable Cardioverter Defibrillator      If you have any questions, please call [phone] to speak with a nurse (8:30am-4pm, or [phone] after hours)  For appointments, please call [phone]  WHAT YOU SHOULD KNOW:    An implantable cardioverter defibrillator (ICD) is a small device that monitors your heart rate and rhythm  It is commonly placed inside your upper chest region  It may be used if you have a ventricular arrhythmia, which is an irregular, dangerous rhythm from the bottom chamber of your heart  Some arrhythmias may cause your heart to suddenly stop beating  An ICD can give a shock to your heart to make it start beating again, or it can give pacing therapy (also known as pain-free therapy) to return your heart to normal rhythm  It is also a pacemaker, so it will pace your heart if needed to prevent it from beating too slowly            AFTER YOU LEAVE:      Follow up with your primary healthcare provider or cardiologist as directed: You will need to follow-up to have your ICD checked and make sure you are not having problems   Write down your questions so you remember to ask them during your visits  Self-care:   · Ask about activity: Ask if you need to avoid moving your shoulder or arm, and for how long  Ask if you should avoid lifting heavy objects  Do not play any contact sports, such as football or wrestling, until your primary healthcare provider [facility] or cardiologist tells you it is okay  You may only be able to drive for a certain amount of time per day, or during certain hours  Ask when you can return to work  · Care for your skin over the ICD: Ask your PHP or cardiologist when it is okay for you to bathe  Do not put any lotion or powder on the incision area  Do not rub or wear tight clothing over the ICD area until your PHP or cardiologist tells you it is okay  When you get a shock from your ICD: A shock may feel like someone has hit you, or you may feel a thump in the chest  If someone is touching you when you get a shock, they may feel a tingling feeling  The first time you feel a shock, it may scare you  Sit or lie down and stay calm  Ask someone to stay with you if possible  Please either call your cardiologist or report to an emergency room  Safety instructions when you have an ICD:   · Carry an ID card for the ICD: This card has important information about your ICD  · Stay away from magnets or machines with electric fields: This includes MRI machines  Avoid leaning into a car engine or doing welding  These things can interfere with how your ICD works  · Tell airport security you have an ICD: You may need to be searched by hand when you go through a security gate  The security gate or handheld wand could harm your ICD  · Keep an ICD diary: Record when you get a shock and what you were doing before you got the shock  Keep track of how you felt before and after the shock, as well as how many shocks you received  Write down the day and time of each shock   Bring the diary with you when you see your PHP or cardiologist    · Carry medical alert identification: Wear medical alert jewelry or carry a card that says you have an ICD  Ask your PHP or cardiologist where to get these items  Contact your primary healthcare provider or cardiologist if:   · You have a fever  · You feel 1 or more shocks from your ICD and feel fine afterwards  · Your feet or ankles swell  · The area around your ICD is painful or tender after surgery  · The skin around your stitches or staples is red, swollen, or draining pus or fluid  · You have chills, a cough, and feel weak or achy  · You are sad or anxious and find it hard to do your usual activities  · You have questions or concerns about your condition or care  Seek care immediately or call 911 if:   · Your stitches or staples come apart  · Blood soaks through your bandage  · You feel more than 3 shocks in a row from your ICD  · You become weak, dizzy, or faint  · You feel your heart skip beats or beat very fast or slow, but you do not feel a shock from your ICD  · You have chest pain that does not go away with rest or medicine  © 2014 [employer] is for End User's use only and may not be sold, redistributed or otherwise used for commercial purposes  All illustrations and images included in CareNotes® are the copyrighted property of [employer]  or Denton Barrera  The above information is an  only  It is not intended as medical advice for individual conditions or treatments  Talk to your doctor, nurse or pharmacist before following any medical regimen to see if it is safe and effective for you

## 2018-11-12 NOTE — H&P (VIEW-ONLY)
Cardiology Follow Up    Gamaliel Andrade  [birth year]  [id number]  HEART & VASCULAR [facility] [place]  [address]        Assessment/Plan     1  Chronic systolic HF (heart failure) (HCC)  POCT ECG    Cardiac icd implant     1  Idiopathic CMP    * EF remains 15% despite >90days of GDMT with entresto, Toprol XL and aldactone    * he is currently wearing a lifevest    * all studies do not identify cause of his depressed EF as his stress tests have been negative    * he absolutely has a class 1 indication for a single chamber ICD as he has no pacing needs    * we did discuss SQ ICD vs SC ICD with decision to proceed with traditional subclavian single chamber ICD system due to its ability to perform ATP  This was also discussed between patient and Dr Mendez Nguyễn    * we will arrange for this to be done ASAP so he can stop wearing his lifevest    * no medication holds prior    * educated patient on post ICD site care and f/u instructions to which he is agreeable    * continuing to follow with Dr Lorena Manrique, he is being considered for advanced CHF with LVAD or OHT  History of Present Illness     HPI/INTERVAL HISTORY: Gamaliel Andrade is a 34 y o  male with a history of idiopathic cardiomyopathy EF 86%, chronic systolic CHF EF 62% who presents to the EP office today for ICD consultation  He was referred to us by Dr Worthy Cockayne  Patient was diagnosed with an idiopathic cardiomyopathy in 2015 w/ an EF of 25%  He underwent extensive workup without any causative agents for his depressed EF  He was started on GDMT with ACEI and BB with an improvement of his EF to 40-45% in 2016  He remained in this EF zone until June of 2018 when he was hospitalized for SOB  During his hospitalization he was found to have an EF of 15%  He again underwent extensive workup including cardiac MRI which showed findings of an idiopathic cardiomyopathy and a stress test showing no ischemia   At discharge he was placed on a lifevest with his medications being switched to include entresto w/ continued use of toprol XL as well as aldactone  Despite this medications his EF remains at 15%  Due to this he was referred to us for ICD evaluation  Patient currently has been compliant with all medications and his lifevest  He is not working currently but is staying home to take care of his 3and 11year old children while his wife is currently working  He used to work as a   He denies any family hx of SCD, CHF, CAD  He has no personal hx of syncope  No hospitalizations for VT/VF  Denies any alcohol use or illicit drug use  No OTC supplements or medications  He does have sleep apnea for which he is obtaining a CPAP in the near future  Review of Systems  ROS as noted above, otherwise 12 point review of systems was performed and is negative  Historical Information   Social History     Social History    Marital status: /Civil Union     Spouse name: N/A    Number of children: N/A    Years of education: N/A     Occupational History    Not on file  Social History Main Topics    Smoking status: Former Smoker     Packs/day: 1 00     Years: 17 00     Quit date: 2015    Smokeless tobacco: Never Used    Alcohol use No      Comment: rare    Drug use: No    Sexual activity: Not on file     Other Topics Concern    Not on file     Social History Narrative    No narrative on file     Past Medical History:   Diagnosis Date    CHF (congestive heart failure) ([facility] [address] )      Past Surgical History:   Procedure Laterality Date    CARDIAC CATHETERIZATION       History   Alcohol Use No     Comment: rare     History   Drug Use No     History   Smoking Status    Former Smoker    Packs/day: 1 00    Years: 17 00    Quit date: 2015   Smokeless Tobacco    Never Used     History reviewed  No pertinent family history      Meds/Allergies       Current Outpatient Prescriptions:     aspirin (ECOTRIN LOW STRENGTH) 81 mg EC tablet, Take 1 tablet (81 mg total) by mouth daily, Disp: , Rfl: 0    atorvastatin (LIPITOR) 80 mg tablet, Take 1 tablet (80 mg total) by mouth daily, Disp: 90 tablet, Rfl: 3    furosemide (LASIX) 40 mg tablet, Take 1 tablet (40 mg total) by mouth 2 (two) times a day, Disp: 60 tablet, Rfl: 2    metoprolol succinate (TOPROL-XL) 100 mg 24 hr tablet, Take 1 5 tablets (150 mg total) by mouth daily, Disp: 45 tablet, Rfl: 5    sacubitril-valsartan (ENTRESTO) 24-26 MG TABS, Take 1 tablet by mouth 2 (two) times a day, Disp: 180 tablet, Rfl: 3    spironolactone (ALDACTONE) 25 mg tablet, Take 1 tablet (25 mg total) by mouth daily, Disp: 90 tablet, Rfl: 5    Allergies   Allergen Reactions    Prednisone Shortness Of Breath and Edema       Objective   Vitals: Blood pressure 132/78, pulse 65, height 5' 10" (1 778 m), weight 127 kg (279 lb 12 8 oz)  Physical Exam   Constitutional: He is oriented to person, place, and time  He appears well-developed and well-nourished  HENT:   Head: Normocephalic and atraumatic  Eyes: Pupils are equal, round, and reactive to light  EOM are normal    Neck: Normal range of motion  Neck supple  Cardiovascular: Normal rate and regular rhythm  Pulmonary/Chest: Effort normal and breath sounds normal    Abdominal: Soft  Bowel sounds are normal    Musculoskeletal: Normal range of motion  Neurological: He is alert and oriented to person, place, and time  Skin: Skin is warm and dry  Psychiatric: He has a normal mood and affect  Labs:not applicable    Imaging: I have personally reviewed pertinent reports        ECHO:   Results for orders placed during the hospital encounter of 06/19/18   Echo complete with contrast if indicated    Narrative [address] [place] [phone]    Transthoracic Echocardiogram  2D, M-mode, Doppler, and Color Doppler    Study date:  20-Jun-2018    Patient: Leida Raulito [facility]  MR number: [id number]  Account number: [de-identified]  : 1980  Age: 42 years  Gender: Male  Status: Outpatient  Location: Bedside  Height: 70 in  Weight: 250 lb  BP: 127/ 68 mmHg    Indications: Acute Myocardial Infarction    Diagnoses: 410 80 - AMI NEC, UNSPECIFIED    Sonographer:  ANA Ziegler,RDCS  Interpreting Physician:  [place] MD  Referring Physician:  Arjun Wheeler DO  Group:  [facility]    SUMMARY    LEFT VENTRICLE:  The ventricle was moderately dilated  Ejection fraction was estimated to be 15 %  There was severe diffuse hypokinesis  LEFT ATRIUM:  The atrium was mildly to moderately dilated  MITRAL VALVE:  There was moderate regurgitation  TRICUSPID VALVE:  There was trace regurgitation  PULMONIC VALVE:  There was trace regurgitation  HISTORY: PRIOR HISTORY: Chest Pain, Elevated Troponins, Cardiomyopathy, Congestive Heart Failure    PROCEDURE: The procedure was performed at the bedside  This was a routine study  The transthoracic approach was used  The study included complete 2D imaging, M-mode, complete spectral Doppler, and color Doppler  The heart rate was 91 bpm,  at the start of the study  Images were obtained from the parasternal, apical, subcostal, and suprasternal notch acoustic windows  Intravenous contrast ( 0 6ml Definity used in NSS) was administered to opacify the left ventricle  Image  quality was adequate  LEFT VENTRICLE: The ventricle was moderately dilated  Ejection fraction was estimated to be 15 %  There was severe diffuse hypokinesis  RIGHT VENTRICLE: The size was normal  Systolic function was normal  Wall thickness was normal     LEFT ATRIUM: The atrium was mildly to moderately dilated  RIGHT ATRIUM: Size was normal     MITRAL VALVE: Valve structure was normal  There was normal leaflet separation  DOPPLER: The transmitral velocity was within the normal range  There was no evidence for stenosis   There was moderate regurgitation  AORTIC VALVE: The valve was trileaflet  Leaflets exhibited normal thickness and normal cuspal separation  DOPPLER: Transaortic velocity was within the normal range  There was no evidence for stenosis  There was no regurgitation  TRICUSPID VALVE: The valve structure was normal  There was normal leaflet separation  DOPPLER: The transtricuspid velocity was within the normal range  There was no evidence for stenosis  There was trace regurgitation  PULMONIC VALVE: Leaflets exhibited normal thickness, no calcification, and normal cuspal separation  DOPPLER: The transpulmonic velocity was within the normal range  There was trace regurgitation  PERICARDIUM: There was no pericardial effusion  The pericardium was normal in appearance  AORTA: The root exhibited normal size  SYSTEM MEASUREMENT TABLES    2D  %FS: 7 8 %  Ao Diam: 2 8 cm  EDV(Teich): 232 9 ml  EF(Teich): 16 8 %  ESV(Teich): 193 8 ml  IVSd: 0 8 cm  LA Area: 24 5 cm2  LA Diam: 5 cm  LVEDV MOD A4C: 334 7 ml  LVEF MOD A4C: 37 8 %  LVESV MOD A4C: 208 4 ml  LVIDd: 6 7 cm  LVIDs: 6 2 cm  LVLd A4C: 11 3 cm  LVLs A4C: 11 1 cm  LVPWd: 1 1 cm  RA Area: 16 5 cm2  RVIDd: 2 9 cm  SV MOD A4C: 126 4 ml  SV(Teich): 39 1 ml    CW  MR VTI: 147 9 cm  MR Vmax: 5 m/s  MR Vmean: 3 7 m/s  MR maxP 1 mmHg  MR meanP 6 mmHg  TR Vmax: 2 6 m/s  TR maxP 1 mmHg    MM  TAPSE: 1 8 cm    PW  MV A Robin: 0 7 m/s  MV Dec [place]: 11 4 m/s2  MV DecT: 86 8 ms  MV E Robin: 1 m/s  MV E/A Ratio: 1 5  MV PHT: 25 2 ms  MVA By PHT: 8 7 cm2    [employer] Accredited Echocardiography Laboratory    Prepared and electronically signed by    Lucho Rouse MD  Signed 2018 16:08:19         CATH/STRESS TEST (2018):   SUMMARY:  -  Stress results: There was no chest pain during stress  -  ECG conclusions: The stress ECG was non-diagnostic due to resting ST/T wave abnormality   -  Perfusion imaging:  The left ventricle was severely dilated  -  Gated SPECT: The calculated left ventricular ejection fraction was 17 %  Left ventricular ejection fraction was markedly decreased by visual estimate  LVEF 20%  There was severe global left ventricular hypokinesis  Cardiac MRI:   Findings:    1  Technically difficult study due to presence of ectopy  Unable to accurately measure left ventricular ejection fraction or volumes  2  The left ventricle is severely dilated with mild concentric left ventricular hypertrophy  Left ventricular systolic function is severely reduced with an estimated ejection fraction of 25%  There is severe global hypokinesis with dyskinesis of the   basal to mid interventricular septum  3  The right ventricle is normal in size with normal right ventricular systolic function  4  The aortic, mitral, and tricuspid valves open without restriction  There is no significant valvular regurgitation, however cine MRI is inaccurate in the qualitative assessment of valvular regurgitation  5  The left atrium is mildly dilated  The aortic root is normal in size  5  There is no evidence of myocardial edema  6  Delayed post-gadolinium imaging demonstrates intramyocardial hyperenhancement of the basal to mid interventricular septum      IMPRESSION:  Impression:  1  Severely dilated left ventricle with severely reduced left ventricular systolic function  2  Normal right ventricular size and systolic function  3  No significant valvular abnormalities  4  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

## 2018-11-13 ENCOUNTER — APPOINTMENT (OUTPATIENT)
Dept: RADIOLOGY | Facility: HOSPITAL | Age: 38
End: 2018-11-13
Payer: COMMERCIAL

## 2018-11-13 VITALS
HEART RATE: 72 BPM | BODY MASS INDEX: 39.37 KG/M2 | SYSTOLIC BLOOD PRESSURE: 154 MMHG | DIASTOLIC BLOOD PRESSURE: 90 MMHG | WEIGHT: 275 LBS | RESPIRATION RATE: 18 BRPM | OXYGEN SATURATION: 98 % | HEIGHT: 70 IN | TEMPERATURE: 98 F

## 2018-11-13 DIAGNOSIS — I42.0 CARDIOMYOPATHY, DILATED, NONISCHEMIC (HCC): ICD-10-CM

## 2018-11-13 LAB
ANION GAP SERPL CALCULATED.3IONS-SCNC: 5 MMOL/L (ref 4–13)
BUN SERPL-MCNC: 17 MG/DL (ref 5–25)
CALCIUM SERPL-MCNC: 8.6 MG/DL (ref 8.3–10.1)
CHLORIDE SERPL-SCNC: 103 MMOL/L (ref 100–108)
CO2 SERPL-SCNC: 27 MMOL/L (ref 21–32)
CREAT SERPL-MCNC: 1.22 MG/DL (ref 0.6–1.3)
ERYTHROCYTE [DISTWIDTH] IN BLOOD BY AUTOMATED COUNT: 12.3 % (ref 11.6–15.1)
GFR SERPL CREATININE-BSD FRML MDRD: 74 ML/MIN/1.73SQ M
GLUCOSE SERPL-MCNC: 102 MG/DL (ref 65–140)
HCT VFR BLD AUTO: 51.5 % (ref 36.5–49.3)
HGB BLD-MCNC: 16.4 G/DL (ref 12–17)
MCH RBC QN AUTO: 29.2 PG (ref 26.8–34.3)
MCHC RBC AUTO-ENTMCNC: 31.8 G/DL (ref 31.4–37.4)
MCV RBC AUTO: 92 FL (ref 82–98)
PLATELET # BLD AUTO: 226 THOUSANDS/UL (ref 149–390)
PMV BLD AUTO: 11.8 FL (ref 8.9–12.7)
POTASSIUM SERPL-SCNC: 4 MMOL/L (ref 3.5–5.3)
RBC # BLD AUTO: 5.61 MILLION/UL (ref 3.88–5.62)
SODIUM SERPL-SCNC: 135 MMOL/L (ref 136–145)
WBC # BLD AUTO: 12.26 THOUSAND/UL (ref 4.31–10.16)

## 2018-11-13 PROCEDURE — 71046 X-RAY EXAM CHEST 2 VIEWS: CPT

## 2018-11-13 PROCEDURE — 85027 COMPLETE CBC AUTOMATED: CPT | Performed by: PHYSICIAN ASSISTANT

## 2018-11-13 PROCEDURE — 80048 BASIC METABOLIC PNL TOTAL CA: CPT | Performed by: PHYSICIAN ASSISTANT

## 2018-11-13 RX ORDER — OXYCODONE HYDROCHLORIDE AND ACETAMINOPHEN 5; 325 MG/1; MG/1
1 TABLET ORAL EVERY 8 HOURS PRN
Qty: 20 TABLET | Refills: 0 | Status: SHIPPED | OUTPATIENT
Start: 2018-11-13 | End: 2018-11-13 | Stop reason: SDUPTHER

## 2018-11-13 RX ORDER — OXYCODONE HYDROCHLORIDE AND ACETAMINOPHEN 5; 325 MG/1; MG/1
1 TABLET ORAL EVERY 8 HOURS PRN
Qty: 20 TABLET | Refills: 0 | Status: SHIPPED | OUTPATIENT
Start: 2018-11-13 | End: 2019-09-18 | Stop reason: ALTCHOICE

## 2018-11-13 RX ADMIN — SACUBITRIL AND VALSARTAN 1 TABLET: 24; 26 TABLET, FILM COATED ORAL at 09:58

## 2018-11-13 RX ADMIN — FUROSEMIDE 40 MG: 40 TABLET ORAL at 09:56

## 2018-11-13 RX ADMIN — ACETAMINOPHEN 650 MG: 325 TABLET ORAL at 01:17

## 2018-11-13 RX ADMIN — METOPROLOL SUCCINATE 150 MG: 100 TABLET, EXTENDED RELEASE ORAL at 09:56

## 2018-11-13 RX ADMIN — ASPIRIN 81 MG: 81 TABLET, COATED ORAL at 09:56

## 2018-11-13 RX ADMIN — OXYCODONE HYDROCHLORIDE AND ACETAMINOPHEN 1 TABLET: 5; 325 TABLET ORAL at 05:06

## 2018-11-13 RX ADMIN — ATORVASTATIN CALCIUM 80 MG: 80 TABLET, FILM COATED ORAL at 09:56

## 2018-11-13 RX ADMIN — SPIRONOLACTONE 25 MG: 25 TABLET, FILM COATED ORAL at 09:56

## 2018-11-13 NOTE — UTILIZATION REVIEW
Initial Clinical Review    Age/Sex: 34 y o  male    Surgery Date: 11/12/18    Procedure:   PREOPERATIVE DIAGNOSIS:   1  Chronic Congestive Heart Failure with Systolic Dysfunction EF of less than 20% >3 months with on optimal medical therapy including betablocker and ACE/ARB (unless contraindicated) and NYHAIII symptoms      POSTOPERATIVE DIAGNOSIS: Successful Single chamber AICD implant  Same as Preop  Anesthesia: GENERAL     Admission Orders: Date/Time/Statement: [facility] 11/12/18 @ 0744    Vital Signs: /77 (BP Location: Right arm)   Pulse 60   Temp 97 5 °F (36 4 °C) (Oral)   Resp 18   Ht 5' 10" (1 778 m)   Wt 125 kg (275 lb)   SpO2 97%   BMI 39 46 kg/m²     Diet:        Diet Orders            Start     Ordered    11/12/18 1050  Diet Cardiac; Cardiac TLC 2 3 GM NA  Diet effective now     Question Answer Comment   Diet Type Cardiac    Cardiac Cardiac TLC 2 3 GM NA    RD to adjust diet per protocol? Yes        11/12/18 1049        Mobility: up as shruti   Tele    DVT Prophylaxis: SCD    Pain Control:   PRN Meds:   Acetaminophen x3    oxyCODONE-acetaminophen x2      [address] Utilization Review Department  Phone: [phone]; Fax [phone]  [email or web address]  ATTENTION: Please call with any questions or concerns to [phone]  and carefully listen to the prompts so that you are directed to the right person  Send all requests for admission clinical reviews, approved or denied determinations and any other requests to fax [phone]   All voicemails are confidential

## 2018-11-13 NOTE — DISCHARGE SUMMARY
Patient with no acute issues overnight, denies chest pain, dyspnea, palpitations, dizziness or lightheadedness  Mild pain near incision relieved with percocet  Incision is c/d/i without swelling, hematoma, redness, bleeding, drainage, or signs of infection  CXR shows appropriate device placement without pneumothorax  VSS, labs reviewed  Device interrogation shows appropriate device function, including lead sensing, threshold, and impedance  Physical exam shows patient in NAD, AAO x 3, RRR no murmurs, rubs, or gallops appreciated, lungs CTA b/l without wheezes, rhonchi or rales, LE with no significant clubbing, cyanosis, or edema b/l  Discharge instructions and restrictions reviewed in detail, follow up appointment arranged  Patient will continue on prior medical regimen, including Toprol XL and Entresti  No changes were made  Patient is stable for discharge at this time with all questions answered

## 2018-11-26 ENCOUNTER — IN-CLINIC DEVICE VISIT (OUTPATIENT)
Dept: CARDIOLOGY CLINIC | Facility: CLINIC | Age: 38
End: 2018-11-26

## 2018-11-26 DIAGNOSIS — I50.22 CHRONIC SYSTOLIC HF (HEART FAILURE) (HCC): Primary | ICD-10-CM

## 2018-11-26 DIAGNOSIS — L03.313 CELLULITIS OF CHEST WALL: Primary | ICD-10-CM

## 2018-11-26 DIAGNOSIS — Z95.810 AICD (AUTOMATIC CARDIOVERTER/DEFIBRILLATOR) PRESENT: ICD-10-CM

## 2018-11-26 PROCEDURE — 99024 POSTOP FOLLOW-UP VISIT: CPT | Performed by: INTERNAL MEDICINE

## 2018-11-26 RX ORDER — CEPHALEXIN 500 MG/1
500 CAPSULE ORAL EVERY 12 HOURS SCHEDULED
Qty: 20 CAPSULE | Refills: 0 | Status: CANCELLED | OUTPATIENT
Start: 2018-11-26 | End: 2018-12-06

## 2018-11-26 RX ORDER — CEPHALEXIN 500 MG/1
500 CAPSULE ORAL EVERY 12 HOURS SCHEDULED
Qty: 20 CAPSULE | Refills: 0 | Status: SHIPPED | OUTPATIENT
Start: 2018-11-26 | End: 2018-12-06

## 2018-11-26 NOTE — PROGRESS NOTES
Results for orders placed or performed in visit on 11/26/18   Cardiac EP device report    Narrative    WOUND CHECK: INCISION CLEAN AND DRY WITH EDGES APPROXIMATED; ORIGINAL STRIPS REMOVED; WOUND CARE AND RESTRICTIONS REVIEWED WITH PATIENT  PROBABLE INFECT NOTED  DR LINK MADE AWARE  DR Annabella Akhtar ASSESS & PT PUT ON KEFLEX AND 1WK SITE F/UP  STRIPS REAPPLIED TO SPECIFIC AREA  (PICTURE ATTACHED)  DEVICE INTERROGATED IN THE [place] OFFICE: BATTERY VOLTAGE ADEQUATE   0%  ALL LEAD PARAMETERS & TRENDS WITHIN NORMAL LIMITS  1 NSVT NOTED, NO THERAPIES GIVEN  EF 15% & PT ON METO SUCC  OPTI-VOL FLUID THRESHOLD INITIALIZING  NO PROGRAMMING CHANGES MADE TO DEVICE PARAMETERS  NORMAL DEVICE FUNCTION   NC

## 2018-12-02 DIAGNOSIS — I42.0 CARDIOMYOPATHY, DILATED, NONISCHEMIC (HCC): ICD-10-CM

## 2018-12-03 ENCOUNTER — IN-CLINIC DEVICE VISIT (OUTPATIENT)
Dept: CARDIOLOGY CLINIC | Facility: CLINIC | Age: 38
End: 2018-12-03

## 2018-12-03 DIAGNOSIS — I42.9 CARDIOMYOPATHY, UNSPECIFIED TYPE (HCC): Primary | ICD-10-CM

## 2018-12-03 DIAGNOSIS — B99.9 INFECTION: Primary | ICD-10-CM

## 2018-12-03 DIAGNOSIS — Z95.810 AICD (AUTOMATIC CARDIOVERTER/DEFIBRILLATOR) PRESENT: ICD-10-CM

## 2018-12-03 PROCEDURE — 99024 POSTOP FOLLOW-UP VISIT: CPT | Performed by: INTERNAL MEDICINE

## 2018-12-03 RX ORDER — FUROSEMIDE 40 MG/1
TABLET ORAL
Qty: 90 TABLET | Refills: 3 | Status: SHIPPED | OUTPATIENT
Start: 2018-12-03 | End: 2018-12-06 | Stop reason: SDUPTHER

## 2018-12-03 RX ORDER — DOXYCYCLINE 100 MG/1
100 CAPSULE ORAL DAILY
Qty: 5 CAPSULE | Refills: 0 | OUTPATIENT
Start: 2018-12-03 | End: 2018-12-08

## 2018-12-03 NOTE — PROGRESS NOTES
Results for orders placed or performed in visit on 12/03/18   Cardiac EP device report    Narrative    WOUND CHECK (PICTURE ATTACHED): INCISION CLEAN AND DRY WITH EDGES APPROXIMATED EXCEPT FOR SMALL OPENING @ LATERAL EDGE W/ SLIGHT PURULENT DRAINAGE NOTED ON STERISTRIP  PT DENIES ANY PAIN OR FEVER  PT IS ON DAY #7 OF 10 DAY COURSE OF KEFLEX  DR COTE IN TO SEE SITE & INSTRUCTED TO START DOXYCYCINE 100MG DAILY X 5 DAYS AFTER FINISHING KEFLEX  STERISTRIPS REAPPLIED  PT SCHEDULED FOR F/U SITE CHECK IN 1 WEEK   GV

## 2018-12-04 ENCOUNTER — OFFICE VISIT (OUTPATIENT)
Dept: PULMONOLOGY | Facility: CLINIC | Age: 38
End: 2018-12-04
Payer: COMMERCIAL

## 2018-12-04 VITALS
HEART RATE: 58 BPM | WEIGHT: 271 LBS | BODY MASS INDEX: 38.8 KG/M2 | SYSTOLIC BLOOD PRESSURE: 120 MMHG | OXYGEN SATURATION: 97 % | HEIGHT: 70 IN | DIASTOLIC BLOOD PRESSURE: 74 MMHG

## 2018-12-04 DIAGNOSIS — I42.0 CARDIOMYOPATHY, DILATED, NONISCHEMIC (HCC): ICD-10-CM

## 2018-12-04 DIAGNOSIS — G47.33 OSA (OBSTRUCTIVE SLEEP APNEA): Primary | ICD-10-CM

## 2018-12-04 DIAGNOSIS — E66.01 CLASS 2 SEVERE OBESITY DUE TO EXCESS CALORIES WITH SERIOUS COMORBIDITY AND BODY MASS INDEX (BMI) OF 38.0 TO 38.9 IN ADULT (HCC): ICD-10-CM

## 2018-12-04 PROCEDURE — 99213 OFFICE O/P EST LOW 20 MIN: CPT | Performed by: PHYSICIAN ASSISTANT

## 2018-12-04 NOTE — PROGRESS NOTES
Assessment:    1  TYRON (obstructive sleep apnea)     2  Cardiomyopathy, dilated, nonischemic (HCC)     3  Class 2 severe obesity due to excess calories with serious comorbidity and body mass index (BMI) of 38 0 to 38 9 in adult [facility])           Plan:     Patient is here for follow-up on his CPAP was started on auto CPAP for severe sleep apnea  His download compliance showed out of 30 days he used it for 8 nights, his average AHI was 7 1  So the did come down significantly on the nights that he did use it  Discussed with him the importance of using the CPAP machine especially given use his extensive cardiac history, just had an AICD placed  He will be more compliant with the CPAP, he will follow up with us in 3 months for another compliance visit or sooner if necessary  Subjective:     Patient ID: Deb Whyte is a 34 y o  male  Chief Complaint:  Patient is a 25-year-old male nonsmoker with extensive cardiac history who was referred by his cardiologist for sleep apnea evaluation  He did undergo sleep study which showed severe sleep apnea with AHI of 56  He was started on CPAP and is here for follow-up  Was recently hospitalized for an AICD placement so he was not using his CPAP as often as he should  He is having some discomfort in having trouble trying to use the CPAP  Out of 30 days he used it about 8 nights  His AHI did come down on the nights that he used it  Review of Systems   Constitutional: Negative  Daytime sleepiness   HENT: Negative  Respiratory: Negative  Cardiovascular: Negative  Gastrointestinal: Negative  Genitourinary: Negative  Musculoskeletal: Negative  Skin: Negative  Allergic/Immunologic: Negative  Neurological: Negative  Psychiatric/Behavioral: Negative            Objective:  /74   Pulse 58   Ht 5' 10" (1 778 m)   Wt 123 kg (271 lb)   SpO2 97%   BMI 38 88 kg/m²   Physical Exam   Constitutional: He is oriented to person, place, and time  He appears well-developed and well-nourished  No distress  HENT:   Mouth/Throat: Oropharynx is clear and moist    Crowded oropharyngeal airway   Eyes: Pupils are equal, round, and reactive to light  Neck:   Short and wide neck   Cardiovascular: Normal rate and regular rhythm  Pulmonary/Chest: Effort normal and breath sounds normal  No respiratory distress  He has no decreased breath sounds  He has no wheezes  He has no rhonchi  He has no rales  Abdominal: Soft  Musculoskeletal: Normal range of motion  He exhibits no edema  Neurological: He is alert and oriented to person, place, and time  Skin: Skin is warm and dry  Psychiatric: He has a normal mood and affect   His behavior is normal  Judgment and thought content normal

## 2018-12-06 DIAGNOSIS — I42.0 CARDIOMYOPATHY, DILATED, NONISCHEMIC (HCC): ICD-10-CM

## 2018-12-06 RX ORDER — FUROSEMIDE 40 MG/1
40 TABLET ORAL 2 TIMES DAILY
Qty: 180 TABLET | Refills: 3 | Status: SHIPPED | OUTPATIENT
Start: 2018-12-06 | End: 2019-12-27 | Stop reason: SDUPTHER

## 2018-12-14 ENCOUNTER — IN-CLINIC DEVICE VISIT (OUTPATIENT)
Dept: CARDIOLOGY CLINIC | Facility: CLINIC | Age: 38
End: 2018-12-14

## 2018-12-14 DIAGNOSIS — Z95.810 PRESENCE OF AUTOMATIC CARDIOVERTER/DEFIBRILLATOR (AICD): Primary | ICD-10-CM

## 2018-12-14 DIAGNOSIS — I42.9 CARDIOMYOPATHY, UNSPECIFIED TYPE (HCC): ICD-10-CM

## 2018-12-14 PROCEDURE — 99024 POSTOP FOLLOW-UP VISIT: CPT | Performed by: INTERNAL MEDICINE

## 2018-12-17 NOTE — PROGRESS NOTES
WOUND CHECK: INCISION CLEAN AND DRY WITH EDGES APPROXIMATED; WOUND CARE AND RESTRICTIONS REVIEWED WITH PATIENT  PT  STATES BEING DONE WITH LAST RUN OF ANTIBIOTICS  INCISION LOOKS MUCH BETTER  PT  TO CALL WITH ANY  MINIMUM CHANGES  PICTURE ATTACHED TO REPORT   PL

## 2019-01-09 ENCOUNTER — TELEPHONE (OUTPATIENT)
Dept: FAMILY MEDICINE CLINIC | Facility: CLINIC | Age: 39
End: 2019-01-09

## 2019-01-09 NOTE — TELEPHONE ENCOUNTER
Previsit planning info states pt has [employer]  T/c to Pt to verify insurance  L/m to [facility]  [facility]

## 2019-01-11 NOTE — TELEPHONE ENCOUNTER
Pt called back  He does have [employer] and is assigned to Dr Gavin Zelaya  Agreed to cancel appt here and call Dr Gavin Zelaya for a New Patient Appt

## 2019-01-14 ENCOUNTER — TELEPHONE (OUTPATIENT)
Dept: CARDIOLOGY CLINIC | Facility: CLINIC | Age: 39
End: 2019-01-14

## 2019-01-15 ENCOUNTER — TELEPHONE (OUTPATIENT)
Dept: CARDIOLOGY CLINIC | Facility: CLINIC | Age: 39
End: 2019-01-15

## 2019-01-17 ENCOUNTER — OFFICE VISIT (OUTPATIENT)
Dept: CARDIOLOGY CLINIC | Facility: CLINIC | Age: 39
End: 2019-01-17
Payer: COMMERCIAL

## 2019-01-17 VITALS
BODY MASS INDEX: 39.22 KG/M2 | HEIGHT: 70 IN | HEART RATE: 87 BPM | WEIGHT: 274 LBS | DIASTOLIC BLOOD PRESSURE: 80 MMHG | OXYGEN SATURATION: 97 % | SYSTOLIC BLOOD PRESSURE: 156 MMHG

## 2019-01-17 DIAGNOSIS — I50.22 CHRONIC SYSTOLIC CHF (CONGESTIVE HEART FAILURE), NYHA CLASS 2 (HCC): Primary | ICD-10-CM

## 2019-01-17 DIAGNOSIS — I10 HTN (HYPERTENSION), BENIGN: ICD-10-CM

## 2019-01-17 DIAGNOSIS — I50.22 CHRONIC SYSTOLIC HEART FAILURE (HCC): ICD-10-CM

## 2019-01-17 DIAGNOSIS — I42.0 DILATED CARDIOMYOPATHY (HCC): ICD-10-CM

## 2019-01-17 DIAGNOSIS — Z99.89 OSA ON CPAP: ICD-10-CM

## 2019-01-17 DIAGNOSIS — G47.33 OSA ON CPAP: ICD-10-CM

## 2019-01-17 PROCEDURE — 99214 OFFICE O/P EST MOD 30 MIN: CPT | Performed by: INTERNAL MEDICINE

## 2019-01-17 NOTE — TELEPHONE ENCOUNTER
Prior authorization for Entresto 24/26 mg ml  Approved  01/17/2019-1/17/2020  [facility] pa medicaid  PA # [id number] RR

## 2019-01-17 NOTE — PROGRESS NOTES
Heart Failure Outpatient Progress Note - Ulysses Melbourne 34 y o  male MRN: [id number]    @ Encounter: [id number]      Assessment/Plan:    Patient Active Problem List    Diagnosis Date Noted    TYRON (obstructive sleep apnea)     Diabetes mellitus type 2 in obese ([facility] ) 06/21/2018    Class 2 obesity due to excess calories in adult 06/21/2018    Chest pain 06/19/2018    Chronic systolic heart failure ([place] [address] ) 06/19/2018    Cardiomyopathy, dilated, nonischemic (HCC) 03/25/2017    Chronic combined systolic and diastolic congestive heart failure ([place] [address] ) 03/25/2017    Angina of effort ([place] [address] ) 03/25/2017    Incidental pulmonary nodule, > 3mm and < 8mm 03/25/2017    Fatty liver disease, nonalcoholic 24/94/6194    Exertional chest pain 03/24/2017    Elevated troponin 03/24/2017    Hyperglycemia 03/24/2017    Leukocytosis 03/24/2017     # Chronic systolic CHF, Stage C, NYHA 2-3  Etiology: NICM  Prior hx of LVEF <20% recovered to ~40-45% on medical therapy, now re-reduced  Unclear reason for re-crudescence  Patient states compliant with medications  No recent illness  No palpitations  ? Natural progression +/- stress induced +/- viral +/- genetic +/- other  Has had extensive workup in the past   Weight: 274 lbs- stable    Diag:  Echo 10/3/18: EF: 15-20%, LVEDd 67 mm  CMR 7/23/18: EF: 25%, LVEDd 72 mm, wall thickness 13 mm  Delayed PEREZ in basal to mide interventricular septum  --cMRI 6/23/2015: LVEF ~ 15%, LVIDd 6 7 cm, Mildly reduced RVSF  Mod MR  Intramyocardial fibrosis seen in the basal anteroseptal wall suggestive of an idiopathic cardiomyopathy   Given myocardial edema, this may suggest acute or subacute  Fibrosis of the right ventricular insertion point into the   interventricular septum, suggesting pulmonary hypertension     --TTE 6/17/2015: LVEF ~25%  --TTE 10/8/2015: LVEF ~40%  --TTE 4/7/2016: LVEF ~40-45%    Neurohormonal Blockade:  --Beta-Blocker: Continue metoprolol succinate 150 mg PO daily  --ACEi, ARB or ARNi: entresto 24/26 mg BID  --Aldosterone Receptor Blocker: Continue spironolactone 25 mg PO daily  --Diuretic: Continue lasix 40 mg PO BID;     Sudden Cardiac Death Risk Reduction:  --ICD: Medtronic single chamber ICD 11/12/18  Interrogation 12/14/18:      Electrical Resynchronization:  --Candidacy for BiV device: Narrow QRS     Advanced Therapies: Will continue to monitor  Hx of recovery in the past  If does not recover, given age, would prefer OHT  He would need to lose a little weight prior  LVAD could be considered as bridge, but will need to evaluate RV function further prior to consideration       # HLD: Start Atorvastatin 80 mg PO daily  # HTN- has room for GDMT for HF  # DMII  # Obesity, BMI ~40  # TYRON- 7/28/18 severe sleep apnea- does not yet have CPAP  # Fatty liver disease    TODAY'S PLAN:  Double entresto to 49/51 mg BID, have a lot of room for afterload reduction  Not sure if his insurance will cover  Is set up for cardiac rehab though not sure with his new insurance  --2g sodium diet  - Daily weights    HPI:   35 yo male who follows for [facility]  Initial hospitalization 6/17 to 6/24/15 with acute heart failure  CM likely due to subacute myocarditis  On 6/17/15 a 2 D echocardiogram showed LV was mildly to moderately dilated  Systolic function was severely reduced with an EF of 26%    A cardiac catheterization was done which showed no CAD  A Cardiac MRI was done which showed severe dilated ventricles and some fibrosis  It was felt his CM was consistent with subacute myocarditis  He was discharged on a Life Vest  On follow up 10/16 he was doing better  His EF was up to 40% on 10/16 echo  He had no decompensated HF symptoms  He sent LifeVEst back  I uptitrated meds, he quit smoking , but he was eating poorly  Had been terminated from job due to inability to do tasks  Admitted to [facility] 6/19/18 with CP and SOB  Stress negative for ischemia  EF < 20% and LVEDd 6 7 cm   Saw Dr Lovely Arias in follow up and lisinopril changed to Entresto 24/26 mg BID  Interval History:  Single chamber Medtronic ICD    Past Medical History:   Diagnosis Date    [facility]congestive heart failure) ([facility])        Review of Systems -    Allergies   Allergen Reactions    Prednisone Shortness Of Breath and Edema       Current Outpatient Prescriptions:     aspirin (ECOTRIN LOW STRENGTH) 81 mg EC tablet, Take 1 tablet (81 mg total) by mouth daily, Disp: , Rfl: 0    atorvastatin (LIPITOR) 80 mg tablet, Take 1 tablet (80 mg total) by mouth daily, Disp: 90 tablet, Rfl: 3    furosemide (LASIX) 40 mg tablet, Take 1 tablet (40 mg total) by mouth 2 (two) times a day, Disp: 180 tablet, Rfl: 3    metoprolol succinate (TOPROL-XL) 100 mg 24 hr tablet, TAKE 1 & 1/2 (ONE & ONE-HALF) TABLETS BY MOUTH DAILY, Disp: 45 tablet, Rfl: 5    oxyCODONE-acetaminophen (PERCOCET) 5-325 mg per tablet, Take 1 tablet by mouth every 8 (eight) hours as needed for severe pain Max Daily Amount: 3 tablets (Patient not taking: Reported on 12/4/2018 ), Disp: 20 tablet, Rfl: 0    sacubitril-valsartan (ENTRESTO) 24-26 MG TABS, Take 1 tablet by mouth 2 (two) times a day, Disp: 180 tablet, Rfl: 3    spironolactone (ALDACTONE) 25 mg tablet, Take 1 tablet (25 mg total) by mouth daily, Disp: 90 tablet, Rfl: 5    Social History     Social History    Marital status: /Civil Union     Spouse name: N/A    Number of children: N/A    Years of education: N/A     Occupational History    Not on file       Social History Main Topics    Smoking status: Former Smoker     Packs/day: 1 00     Years: 17 00     Quit date: 2015    Smokeless tobacco: Never Used    Alcohol use No      Comment: rare    Drug use: No    Sexual activity: Not on file     Other Topics Concern    Not on file     Social History Narrative    No narrative on file       Family History   Problem Relation Age of Onset    Hypertension Mother     Diabetes Father     Asthma Maternal Grandmother     Hypertension Maternal Grandmother        Physical Exam:    Vitals: There were no vitals filed for this visit  Wt Readings from Last 3 Encounters:   12/04/18 123 kg (271 lb)   11/12/18 125 kg (275 lb)   10/30/18 127 kg (279 lb 12 8 oz)       Physical Exam:    GEN: Brenden Rowe appears well, alert and oriented x 3, pleasant and cooperative   HEENT: pupils equal, round, and reactive to light; extraocular muscles intact  NECK: supple, no carotid bruits   HEART: regular rhythm, normal S1 and S2, no murmurs, clicks, gallops or rubs, JVP is    LUNGS: clear to auscultation bilaterally; no wheezes, rales, or rhonchi   ABDOMEN: normal bowel sounds, soft, no tenderness, no distention  EXTREMITIES: peripheral pulses normal; no clubbing, cyanosis, or edema  NEURO: no focal findings   SKIN: normal without suspicious lesions on exposed skin    Labs & Results:    Lab Results   Component Value Date    GLUCOSE 98 08/06/2015    CALCIUM 8 6 11/13/2018     08/06/2015    K 4 0 11/13/2018    CO2 27 11/13/2018     11/13/2018    BUN 17 11/13/2018    CREATININE 1 22 11/13/2018     Lab Results   Component Value Date    WBC 12 26 (H) 11/13/2018    HGB 16 4 11/13/2018    HCT 51 5 (H) 11/13/2018    MCV 92 11/13/2018     11/13/2018     Lab Results   Component Value Date    NTBNP 159 (H) 06/19/2018      Lab Results   Component Value Date    CHOL 121 06/17/2015     Lab Results   Component Value Date    HDL 32 (L) 06/20/2018    HDL 32 (L) 03/25/2017    HDL 30 06/17/2015     Lab Results   Component Value Date    LDLCALC 138 (H) 06/20/2018    LDLCALC 118 (H) 03/25/2017    LDLCALC 72 06/17/2015     Lab Results   Component Value Date    TRIG 78 06/20/2018    TRIG 197 (H) 03/25/2017    TRIG 95 06/17/2015     No results found for: CHOLHDL    EKG personally reviewed by Karlee Robbins DO  Counseling / Coordination of Care  Total floor / unit time spent today 25 minutes    Greater than 50% of total time was spent with the patient and / or family counseling and / or coordination of care  A description of the counseling / coordination of care: 15      Thank you for the opportunity to participate in the care of this patient    ASHLEY HOLLOWAY [address]

## 2019-01-17 NOTE — PATIENT INSTRUCTIONS
Double entresto to 49/51 mg twice daily    Please check daily weights and contact the heart failure program at  if you gain 3 lb in one day or 5 lb in one week  Please limit daily sodium intake to 2000 mg daily  Please limit daily fluid intake to 2000 ml daily  Bring complete list of medications to your follow up appointment  Low-Salt Choices  Eating salt (sodium) can make your body retain too much water  Excess water makes your heart work harder  Canned, packaged, and frozen foods are easy to prepare  But they are often high in sodium  Here are some ideas for low-salt foods you can easily make yourself  For breakfast  · Fruit or 100% fruit juice  · Whole-wheat bread or an English muffin  Look for sodium content on Nutrition Facts labels    · Low-fat milk or yogurt  · Unsalted eggs  · Shredded wheat  · Corn tortillas  · Unsalted steamed rice  · Regular (not instant) hot cereal, made without salt    Stay away from:  · Sausage, machado, and ham  · Flour tortillas  · Packaged muffins, pancakes, and biscuits  · Instant hot cereals  · Cottage cheese    Good Choices:  · Fresh fish, chicken, turkey, or meat--baked, broiled, or roasted without salt  · Dry beans, cooked without salt  · Tofu, stir-fried without salt  · Unsalted fresh fruit and vegetables, or frozen or canned fruit and vegetables with no added salt    Stay away from:  · Lunch or deli meat that is cured or smoked  · Cheese  · Tomato juice and ketchup  · Canned vegetables, soups, and fish not labeled as no-salt-added or reduced sodium  · Packaged gravies and sauces  · Olives, pickles, and relish  · Bottled salad dressings    For snacks and desserts  · Yogurt  · Unsalted, air-popped popcorn  · Unsalted nuts or seeds    Stay away from:  · Pies and cakes  · Packaged dessert mixes  · Pizza  · Canned and packaged puddings  · Pretzels, chips, crackers, and nuts--unless the label says unsalted

## 2019-01-21 ENCOUNTER — TELEPHONE (OUTPATIENT)
Dept: CARDIOLOGY CLINIC | Facility: CLINIC | Age: 39
End: 2019-01-21

## 2019-01-22 ENCOUNTER — TELEPHONE (OUTPATIENT)
Dept: CARDIOLOGY CLINIC | Facility: CLINIC | Age: 39
End: 2019-01-22

## 2019-04-04 ENCOUNTER — IN-CLINIC DEVICE VISIT (OUTPATIENT)
Dept: CARDIOLOGY CLINIC | Facility: CLINIC | Age: 39
End: 2019-04-04
Payer: COMMERCIAL

## 2019-04-04 ENCOUNTER — TELEPHONE (OUTPATIENT)
Dept: CARDIOLOGY CLINIC | Facility: CLINIC | Age: 39
End: 2019-04-04

## 2019-04-04 DIAGNOSIS — I50.22 CHRONIC SYSTOLIC CONGESTIVE HEART FAILURE (HCC): Primary | ICD-10-CM

## 2019-04-04 DIAGNOSIS — Z95.810 PRESENCE OF IMPLANTABLE CARDIOVERTER-DEFIBRILLATOR (ICD): ICD-10-CM

## 2019-04-04 DIAGNOSIS — I42.0 DILATED CARDIOMYOPATHY (HCC): ICD-10-CM

## 2019-04-04 PROCEDURE — 93282 PRGRMG EVAL IMPLANTABLE DFB: CPT | Performed by: INTERNAL MEDICINE

## 2019-05-20 DIAGNOSIS — I42.9 CARDIOMYOPATHY, UNSPECIFIED TYPE (HCC): ICD-10-CM

## 2019-05-20 RX ORDER — METOPROLOL SUCCINATE 100 MG/1
TABLET, EXTENDED RELEASE ORAL
Qty: 45 TABLET | Refills: 5 | Status: SHIPPED | OUTPATIENT
Start: 2019-05-20 | End: 2019-11-12 | Stop reason: SDUPTHER

## 2019-06-13 ENCOUNTER — OFFICE VISIT (OUTPATIENT)
Dept: CARDIOLOGY CLINIC | Facility: CLINIC | Age: 39
End: 2019-06-13
Payer: COMMERCIAL

## 2019-06-13 VITALS
HEART RATE: 66 BPM | BODY MASS INDEX: 38.08 KG/M2 | WEIGHT: 266 LBS | SYSTOLIC BLOOD PRESSURE: 120 MMHG | HEIGHT: 70 IN | OXYGEN SATURATION: 97 % | DIASTOLIC BLOOD PRESSURE: 82 MMHG

## 2019-06-13 DIAGNOSIS — I50.22 CHRONIC SYSTOLIC CHF (CONGESTIVE HEART FAILURE), NYHA CLASS 2 (HCC): Primary | ICD-10-CM

## 2019-06-13 DIAGNOSIS — I42.8 NICM (NONISCHEMIC CARDIOMYOPATHY) (HCC): ICD-10-CM

## 2019-06-13 DIAGNOSIS — E66.9 OBESITY (BMI 35.0-39.9 WITHOUT COMORBIDITY): ICD-10-CM

## 2019-06-13 DIAGNOSIS — Z99.89 OSA ON CPAP: ICD-10-CM

## 2019-06-13 DIAGNOSIS — I10 HTN (HYPERTENSION), BENIGN: ICD-10-CM

## 2019-06-13 DIAGNOSIS — G47.33 OSA ON CPAP: ICD-10-CM

## 2019-06-13 PROCEDURE — 99214 OFFICE O/P EST MOD 30 MIN: CPT | Performed by: INTERNAL MEDICINE

## 2019-06-24 ENCOUNTER — TELEPHONE (OUTPATIENT)
Dept: CARDIOLOGY CLINIC | Facility: CLINIC | Age: 39
End: 2019-06-24

## 2019-06-27 DIAGNOSIS — I10 HYPERTENSION, UNSPECIFIED TYPE: ICD-10-CM

## 2019-06-28 RX ORDER — SPIRONOLACTONE 25 MG/1
25 TABLET ORAL DAILY
Qty: 90 TABLET | Refills: 3 | Status: SHIPPED | OUTPATIENT
Start: 2019-06-28 | End: 2019-12-27 | Stop reason: SDUPTHER

## 2019-07-12 ENCOUNTER — REMOTE DEVICE CLINIC VISIT (OUTPATIENT)
Dept: CARDIOLOGY CLINIC | Facility: CLINIC | Age: 39
End: 2019-07-12
Payer: COMMERCIAL

## 2019-07-12 DIAGNOSIS — Z95.810 AICD (AUTOMATIC CARDIOVERTER/DEFIBRILLATOR) PRESENT: Primary | ICD-10-CM

## 2019-07-12 PROCEDURE — 93296 REM INTERROG EVL PM/IDS: CPT | Performed by: INTERNAL MEDICINE

## 2019-07-12 PROCEDURE — 93297 REM INTERROG DEV EVAL ICPMS: CPT | Performed by: INTERNAL MEDICINE

## 2019-07-12 PROCEDURE — 93295 DEV INTERROG REMOTE 1/2/MLT: CPT | Performed by: INTERNAL MEDICINE

## 2019-07-12 NOTE — PROGRESS NOTES
Results for orders placed or performed in visit on 07/12/19   Cardiac EP device report    Narrative    MDT SINGLE CHAMBER ICD  CARELINK TRANSMISSION: BATTERY STATUS "OK"   0%  ALL AVAILABLE LEAD PARAMETERS WITHIN NORMAL LIMITS  2 NSVT NOTED, NO THERAPIES GIVEN  EF 15% (2018)  PT ON METO SUCC  OPTI-VOL WITHIN NORMAL LIMITS  NORMAL DEVICE FUNCTION   NC

## 2019-07-26 DIAGNOSIS — E78.5 HYPERLIPIDEMIA, UNSPECIFIED HYPERLIPIDEMIA TYPE: ICD-10-CM

## 2019-07-26 RX ORDER — ATORVASTATIN CALCIUM 80 MG/1
80 TABLET, FILM COATED ORAL DAILY
Qty: 90 TABLET | Refills: 2 | Status: SHIPPED | OUTPATIENT
Start: 2019-07-26 | End: 2019-12-27 | Stop reason: SDUPTHER

## 2019-09-03 ENCOUNTER — APPOINTMENT (OUTPATIENT)
Dept: LAB | Facility: HOSPITAL | Age: 39
End: 2019-09-03
Payer: COMMERCIAL

## 2019-09-03 DIAGNOSIS — I50.22 CHRONIC SYSTOLIC CHF (CONGESTIVE HEART FAILURE), NYHA CLASS 2 (HCC): ICD-10-CM

## 2019-09-03 LAB
ALBUMIN SERPL BCP-MCNC: 3.8 G/DL (ref 3.5–5)
ALP SERPL-CCNC: 86 U/L (ref 46–116)
ALT SERPL W P-5'-P-CCNC: 33 U/L (ref 12–78)
ANION GAP SERPL CALCULATED.3IONS-SCNC: 7 MMOL/L (ref 4–13)
AST SERPL W P-5'-P-CCNC: 19 U/L (ref 5–45)
BASOPHILS # BLD AUTO: 0.04 THOUSANDS/ΜL (ref 0–0.1)
BASOPHILS NFR BLD AUTO: 0 % (ref 0–1)
BILIRUB SERPL-MCNC: 0.4 MG/DL (ref 0.2–1)
BUN SERPL-MCNC: 17 MG/DL (ref 5–25)
CALCIUM SERPL-MCNC: 9.1 MG/DL (ref 8.3–10.1)
CHLORIDE SERPL-SCNC: 102 MMOL/L (ref 100–108)
CO2 SERPL-SCNC: 29 MMOL/L (ref 21–32)
CREAT SERPL-MCNC: 1.11 MG/DL (ref 0.6–1.3)
EOSINOPHIL # BLD AUTO: 0.17 THOUSAND/ΜL (ref 0–0.61)
EOSINOPHIL NFR BLD AUTO: 2 % (ref 0–6)
ERYTHROCYTE [DISTWIDTH] IN BLOOD BY AUTOMATED COUNT: 12.1 % (ref 11.6–15.1)
GFR SERPL CREATININE-BSD FRML MDRD: 82 ML/MIN/1.73SQ M
GLUCOSE SERPL-MCNC: 92 MG/DL (ref 65–140)
HCT VFR BLD AUTO: 47.2 % (ref 36.5–49.3)
HGB BLD-MCNC: 15.1 G/DL (ref 12–17)
IMM GRANULOCYTES # BLD AUTO: 0.05 THOUSAND/UL (ref 0–0.2)
IMM GRANULOCYTES NFR BLD AUTO: 1 % (ref 0–2)
LYMPHOCYTES # BLD AUTO: 2.81 THOUSANDS/ΜL (ref 0.6–4.47)
LYMPHOCYTES NFR BLD AUTO: 28 % (ref 14–44)
MCH RBC QN AUTO: 29 PG (ref 26.8–34.3)
MCHC RBC AUTO-ENTMCNC: 32 G/DL (ref 31.4–37.4)
MCV RBC AUTO: 91 FL (ref 82–98)
MONOCYTES # BLD AUTO: 0.86 THOUSAND/ΜL (ref 0.17–1.22)
MONOCYTES NFR BLD AUTO: 9 % (ref 4–12)
NEUTROPHILS # BLD AUTO: 6.02 THOUSANDS/ΜL (ref 1.85–7.62)
NEUTS SEG NFR BLD AUTO: 60 % (ref 43–75)
NRBC BLD AUTO-RTO: 0 /100 WBCS
NT-PROBNP SERPL-MCNC: 128 PG/ML
PLATELET # BLD AUTO: 227 THOUSANDS/UL (ref 149–390)
PMV BLD AUTO: 11.7 FL (ref 8.9–12.7)
POTASSIUM SERPL-SCNC: 4 MMOL/L (ref 3.5–5.3)
PROT SERPL-MCNC: 7.8 G/DL (ref 6.4–8.2)
RBC # BLD AUTO: 5.2 MILLION/UL (ref 3.88–5.62)
SODIUM SERPL-SCNC: 138 MMOL/L (ref 136–145)
WBC # BLD AUTO: 9.95 THOUSAND/UL (ref 4.31–10.16)

## 2019-09-03 PROCEDURE — 36415 COLL VENOUS BLD VENIPUNCTURE: CPT | Performed by: INTERNAL MEDICINE

## 2019-09-03 PROCEDURE — 83880 ASSAY OF NATRIURETIC PEPTIDE: CPT

## 2019-09-03 PROCEDURE — 80053 COMPREHEN METABOLIC PANEL: CPT | Performed by: INTERNAL MEDICINE

## 2019-09-03 PROCEDURE — 85025 COMPLETE CBC W/AUTO DIFF WBC: CPT | Performed by: INTERNAL MEDICINE

## 2019-09-18 ENCOUNTER — OFFICE VISIT (OUTPATIENT)
Dept: CARDIOLOGY CLINIC | Facility: CLINIC | Age: 39
End: 2019-09-18
Payer: COMMERCIAL

## 2019-09-18 VITALS
HEART RATE: 86 BPM | DIASTOLIC BLOOD PRESSURE: 70 MMHG | WEIGHT: 274.9 LBS | SYSTOLIC BLOOD PRESSURE: 110 MMHG | BODY MASS INDEX: 39.35 KG/M2 | OXYGEN SATURATION: 97 % | HEIGHT: 70 IN

## 2019-09-18 DIAGNOSIS — Z99.89 OSA ON CPAP: ICD-10-CM

## 2019-09-18 DIAGNOSIS — I50.22 CHRONIC SYSTOLIC CHF (CONGESTIVE HEART FAILURE), NYHA CLASS 3 (HCC): Primary | ICD-10-CM

## 2019-09-18 DIAGNOSIS — E78.00 PURE HYPERCHOLESTEROLEMIA: ICD-10-CM

## 2019-09-18 DIAGNOSIS — G47.33 OSA ON CPAP: ICD-10-CM

## 2019-09-18 DIAGNOSIS — I42.8 NICM (NONISCHEMIC CARDIOMYOPATHY) (HCC): ICD-10-CM

## 2019-09-18 PROCEDURE — 99214 OFFICE O/P EST MOD 30 MIN: CPT | Performed by: INTERNAL MEDICINE

## 2019-09-18 NOTE — PROGRESS NOTES
Heart Failure Outpatient Progress Note - Vinicio Campos 37 y o  male MRN: [id number]    @ Encounter: [id number]      Assessment/Plan:    Patient Active Problem List    Diagnosis Date Noted    TYRON (obstructive sleep apnea)      Priority: Low    Diabetes mellitus type 2 in obese ([facility] ) 06/21/2018     Priority: Low    Class 2 obesity due to excess calories in adult 06/21/2018     Priority: Low    Chest pain 06/19/2018     Priority: Low    [facility] failure ([facility] ) 06/19/2018     Priority: Low    Cardiomyopathy, dilated, nonischemic ([facility] ) 03/25/2017     Priority: Low    Chronic combined systolic and diastolic congestive heart failure (HCC) 03/25/2017     Priority: Low    Angina of effort ([facility] ) 03/25/2017     Priority: Low    Incidental pulmonary nodule, > 3mm and < 8mm 03/25/2017     Priority: Low    Fatty liver disease, nonalcoholic 39/51/9809     Priority: Low    Exertional chest pain 03/24/2017     Priority: Low    Elevated troponin 03/24/2017     Priority: Low    Hyperglycemia 03/24/2017     Priority: Low    Leukocytosis 03/24/2017     Priority: Low     # Chronic systolic CHF, Stage C, NYHA 2-3  Etiology: NICM  Prior hx of LVEF <20% recovered to ~40-45% on medical therapy, now re-reduced  Unclear reason for re-crudescence  Patient states compliant with medications  No recent illness  No palpitations  ? Natural progression +/- stress induced +/- viral +/- genetic +/- other  Has had extensive workup in the past     Weight: 274 lbs- up a little but not fluid  NT pro BNP: 9/3/19: 128    Diag:  Echo 10/3/18: EF: 15-20%, LVEDd 67 mm  CMR 7/23/18: EF: 25%, LVEDd 72 mm, wall thickness 13 mm  Delayed PEREZ in basal to mide interventricular septum  --cMRI 6/23/2015: LVEF ~ 15%, LVIDd 6 7 cm, Mildly reduced RVSF  Mod MR  Intramyocardial fibrosis seen in the basal anteroseptal wall suggestive of an idiopathic cardiomyopathy   Given myocardial edema, this may suggest acute or subacute   Fibrosis of the right ventricular insertion point into the   interventricular septum, suggesting pulmonary hypertension  --TTE 6/17/2015: LVEF ~25%  --TTE 10/8/2015: LVEF ~40%  --TTE 4/7/2016: LVEF ~40-45%    Neurohormonal Blockade:  --Beta-Blocker: Continue metoprolol succinate 150 mg PO daily  --ACEi, ARB or ARNi: entresto 97/103 mg BID  --Aldosterone Receptor Blocker: Continue spironolactone 25 mg PO daily  --Diuretic: Continue lasix 40 mg PO Daily    Sudden Cardiac Death Risk Reduction:  --ICD: Medtronic single chamber ICD 11/12/18  Interrogation 7/12/19: normal device function     Electrical Resynchronization:  --Candidacy for BiV device: Narrow QRS     Advanced Therapies: Will continue to monitor  Hx of recovery in the past  If does not recover, given age, would prefer OHT  He would need to lose a little weight prior  LVAD could be considered as bridge, but will need to evaluate RV function further prior to consideration       # HLD:  Atorvastatin 80 mg PO daily  6/20/18: , HDL 32  # HTN- has room for GDMT for HF  # DMII  # Obesity, BMI ~40  # TYRON- 7/28/18 severe sleep apnea- does not yet have CPAP  # Fatty liver disease    TODAY'S PLAN:  Yearly echo - check response to up titrate GDMT  --2g sodium diet  - Daily weights    HPI:   33 yo male who follows for [facility]  Initial hospitalization 6/17 to 6/24/15 with acute heart failure  CM likely due to subacute myocarditis  On 6/17/15 a 2 D echocardiogram showed LV was mildly to moderately dilated  Systolic function was severely reduced with an EF of 26%    A cardiac catheterization was done which showed no CAD  A Cardiac MRI was done which showed severe dilated ventricles and some fibrosis  It was felt his CM was consistent with subacute myocarditis  He was discharged on a Life Vest  On follow up 10/16 he was doing better  His EF was up to 40% on 10/16 echo  He had no decompensated HF symptoms  He sent LifeVEst back   I uptitrated meds, he quit smoking , but he was eating poorly  Had been terminated from job due to inability to do tasks  Ad[facility] to [facility]19[facility]18 with CP and SOB  Stress negative for ischemia  EF < 20% and LVEDd 6 7 cm  Saw Dr Colten Cardona in follow up and lisinopril changed to Entresto 24/26 mg BID  ICD implant done on 11/12/18    Interval History:  Last time doubled Entresto to 97/103 mg BID and decreased lasix to once daily  No new symptoms -no dyspnea, no edema  NT pro BNP normal  Device interrogation normal    Review of Systems   Constitutional: Negative for activity change, appetite change, fatigue and unexpected weight change  HENT: Negative for congestion and nosebleeds  Eyes: Negative  Respiratory: Negative for cough, chest tightness and shortness of breath  Cardiovascular: Negative for chest pain, palpitations and leg swelling  Gastrointestinal: Negative for abdominal distention  Endocrine: Negative  Genitourinary: Negative  Musculoskeletal: Negative  Skin: Negative  Neurological: Positive for light-headedness (occasional in am)  Negative for dizziness, syncope and weakness  Hematological: Negative  Psychiatric/Behavioral: Negative  Past Medical History:   Diagnosis Date    CHF (congestive heart failure) (HCC)        Allergies   Allergen Reactions    Prednisone Shortness Of Breath and Edema           Current Outpatient Medications:     aspirin (ECOTRIN LOW STRENGTH) 81 mg EC tablet, Take 1 tablet (81 mg total) by mouth daily, Disp: , Rfl: 0    atorvastatin (LIPITOR) 80 mg tablet, Take 1 tablet (80 mg total) by mouth daily, Disp: 90 tablet, Rfl: 2    furosemide (LASIX) 40 mg tablet, Take 1 tablet (40 mg total) by mouth 2 (two) times a day (Patient taking differently: Take 40 mg by mouth daily ), Disp: 180 tablet, Rfl: 3    metoprolol succinate (TOPROL-XL) 100 mg 24 hr tablet, TAKE 1 & 1/2 (ONE & ONE-HALF) TABLETS BY MOUTH ONCE DAILY, Disp: 45 tablet, Rfl: 5    sacubitril-valsartan (ENTRESTO)  MG TABS, Take 1 tablet by mouth 2 (two) times a day, Disp: 30 tablet, Rfl: 3    spironolactone (ALDACTONE) 25 mg tablet, Take 1 tablet (25 mg total) by mouth daily, Disp: 90 tablet, Rfl: 3    Social History     Socioeconomic History    Marital status: /Civil Union     Spouse name: Not on file    Number of children: Not on file    Years of education: Not on file    Highest education level: Not on file   Occupational History    Not on file   Social Needs    Financial resource strain: Not on file    Food insecurity:     Worry: Not on file     Inability: Not on file    Transportation needs:     Medical: Not on file     Non-medical: Not on file   Tobacco Use    Smoking status: Former Smoker     Packs/day: 1 00     Years: 17      Pack years: 17      Last attempt to quit:      Years since quittin 7    Smokeless tobacco: Never Used   Substance and Sexual Activity    Alcohol use: No     Comment: rare    Drug use: No    Sexual activity: Not on file   Lifestyle    Physical activity:     Days per week: Not on file     Minutes per session: Not on file    Stress: Not on file   Relationships    Social connections:     Talks on phone: Not on file     Gets together: Not on file     Attends Sikhism service: Not on file     Active member of club or organization: Not on file     Attends meetings of clubs or organizations: Not on file     Relationship status: Not on file    Intimate partner violence:     Fear of current or ex partner: Not on file     Emotionally abused: Not on file     Physically abused: Not on file     Forced sexual activity: Not on file   Other Topics Concern    Not on file   Social History Narrative    Not on file       Family History   Problem Relation Age of Onset    Hypertension Mother     Diabetes Father     Asthma Maternal Grandmother     Hypertension Maternal Grandmother        Physical Exam:    Vitals:   Vitals:    19 1058   BP: 110/70   Pulse: 86   SpO2: 97%     Wt Readings from Last 3 Encounters:   09/18/19 125 kg (274 lb 14 4 oz)   06/13/19 121 kg (266 lb)   01/17/19 124 kg (274 lb)       Physical Exam:    Physical Exam   Constitutional: He is oriented to person, place, and time  He appears well-developed and well-nourished  HENT:   Head: Normocephalic and atraumatic  Eyes: Pupils are equal, round, and reactive to light  EOM are normal    Neck: Normal range of motion  No JVD present  Cardiovascular: Normal rate, regular rhythm and normal heart sounds  No murmur heard  Pulmonary/Chest: Effort normal and breath sounds normal  He has no rales  Abdominal: Soft  Bowel sounds are normal  He exhibits no distension  Musculoskeletal: Normal range of motion  He exhibits no edema  Neurological: He is alert and oriented to person, place, and time  Skin: Skin is warm and dry  He is not diaphoretic  Psychiatric: He has a normal mood and affect  Labs & Results:    Lab Results   Component Value Date    GLUCOSE 98 08/06/2015    CALCIUM 9 1 09/03/2019     08/06/2015    K 4 0 09/03/2019    CO2 29 09/03/2019     09/03/2019    BUN 17 09/03/2019    CREATININE 1 11 09/03/2019     Lab Results   Component Value Date    WBC 9 95 09/03/2019    HGB 15 1 09/03/2019    HCT 47 2 09/03/2019    MCV 91 09/03/2019     09/03/2019     Lab Results   Component Value Date    NTBNP 128 (H) 09/03/2019      Lab Results   Component Value Date    CHOL 121 06/17/2015     Lab Results   Component Value Date    HDL 32 (L) 06/20/2018    HDL 32 (L) 03/25/2017    HDL 30 06/17/2015     Lab Results   Component Value Date    LDLCALC 138 (H) 06/20/2018    LDLCALC 118 (H) 03/25/2017    LDLCALC 72 06/17/2015     Lab Results   Component Value Date    TRIG 78 06/20/2018    TRIG 197 (H) 03/25/2017    TRIG 95 06/17/2015     No results found for: CHOLHDL    EKG personally reviewed by Costa Bernal DO  Counseling / Coordination of Care  Total floor / unit time spent today 25 minutes    Greater than 50% of total time was spent with the patient and / or family counseling and / or coordination of care  A description of the counseling / coordination of care: 15      Thank you for the opportunity to participate in the care of this patient    ASHLEY HOLLOWAY [address]

## 2019-09-18 NOTE — PATIENT INSTRUCTIONS
I ordered an echo to see if you heart function has responded to increase in medical therapy    Otherwise you are doing well and no changed today

## 2019-09-25 ENCOUNTER — HOSPITAL ENCOUNTER (OUTPATIENT)
Dept: NON INVASIVE DIAGNOSTICS | Facility: CLINIC | Age: 39
Discharge: HOME | End: 2019-09-25
Payer: COMMERCIAL

## 2019-09-25 DIAGNOSIS — I50.22 CHRONIC SYSTOLIC CHF (CONGESTIVE HEART FAILURE), NYHA CLASS 3 (HCC): ICD-10-CM

## 2019-09-25 PROCEDURE — 93306 TTE W/DOPPLER COMPLETE: CPT | Performed by: INTERNAL MEDICINE

## 2019-09-25 PROCEDURE — 93306 TTE W/DOPPLER COMPLETE: CPT

## 2019-09-30 DIAGNOSIS — I50.22 CHRONIC SYSTOLIC CHF (CONGESTIVE HEART FAILURE), NYHA CLASS 2 (HCC): ICD-10-CM

## 2019-09-30 RX ORDER — SACUBITRIL AND VALSARTAN 97; 103 MG/1; MG/1
TABLET, FILM COATED ORAL
Qty: 30 TABLET | Refills: 3 | Status: SHIPPED | OUTPATIENT
Start: 2019-09-30 | End: 2019-12-27 | Stop reason: SDUPTHER

## 2019-10-10 ENCOUNTER — REMOTE DEVICE CLINIC VISIT (OUTPATIENT)
Dept: CARDIOLOGY CLINIC | Facility: CLINIC | Age: 39
End: 2019-10-10
Payer: COMMERCIAL

## 2019-10-10 DIAGNOSIS — Z95.810 AICD (AUTOMATIC CARDIOVERTER/DEFIBRILLATOR) PRESENT: Primary | ICD-10-CM

## 2019-10-10 PROCEDURE — 93296 REM INTERROG EVL PM/IDS: CPT | Performed by: INTERNAL MEDICINE

## 2019-10-10 PROCEDURE — 93297 REM INTERROG DEV EVAL ICPMS: CPT | Performed by: INTERNAL MEDICINE

## 2019-10-10 PROCEDURE — 93295 DEV INTERROG REMOTE 1/2/MLT: CPT | Performed by: INTERNAL MEDICINE

## 2019-10-10 NOTE — PROGRESS NOTES
Results for orders placed or performed in visit on 10/10/19   Cardiac EP device report    Narrative    MDT SINGLE CHAMBER ICD  CARELINK TRANSMISSION: BATTERY STATUS "OK"   0%  ALL AVAILABLE LEAD PARAMETERS WITHIN NORMAL LIMITS  2 VHRS NOTED, NO THERAPIES GIVEN  PT ON METO SUCC & EF 15% (2019)  OPTI-VOL WITHIN NORMAL LIMITS  NORMAL DEVICE FUNCTION   NC

## 2019-11-12 DIAGNOSIS — I42.9 CARDIOMYOPATHY, UNSPECIFIED TYPE (HCC): ICD-10-CM

## 2019-11-12 RX ORDER — METOPROLOL SUCCINATE 100 MG/1
TABLET, EXTENDED RELEASE ORAL
Qty: 45 TABLET | Refills: 5 | Status: SHIPPED | OUTPATIENT
Start: 2019-11-12 | End: 2019-12-27 | Stop reason: SDUPTHER

## 2019-12-27 ENCOUNTER — OFFICE VISIT (OUTPATIENT)
Dept: CARDIOLOGY CLINIC | Facility: CLINIC | Age: 39
End: 2019-12-27
Payer: COMMERCIAL

## 2019-12-27 VITALS
WEIGHT: 282.2 LBS | HEART RATE: 84 BPM | HEIGHT: 70 IN | BODY MASS INDEX: 40.4 KG/M2 | SYSTOLIC BLOOD PRESSURE: 130 MMHG | DIASTOLIC BLOOD PRESSURE: 78 MMHG

## 2019-12-27 DIAGNOSIS — Z99.89 OSA ON CPAP: ICD-10-CM

## 2019-12-27 DIAGNOSIS — G47.33 OSA ON CPAP: ICD-10-CM

## 2019-12-27 DIAGNOSIS — I50.22 CHRONIC SYSTOLIC CHF (CONGESTIVE HEART FAILURE), NYHA CLASS 2 (HCC): ICD-10-CM

## 2019-12-27 DIAGNOSIS — I42.0 CARDIOMYOPATHY, DILATED, NONISCHEMIC (HCC): ICD-10-CM

## 2019-12-27 DIAGNOSIS — I50.42 CHRONIC COMBINED SYSTOLIC AND DIASTOLIC CONGESTIVE HEART FAILURE (HCC): Primary | ICD-10-CM

## 2019-12-27 DIAGNOSIS — E78.5 HYPERLIPIDEMIA LDL GOAL <70: ICD-10-CM

## 2019-12-27 DIAGNOSIS — I10 HYPERTENSION, UNSPECIFIED TYPE: ICD-10-CM

## 2019-12-27 DIAGNOSIS — E78.5 HYPERLIPIDEMIA, UNSPECIFIED HYPERLIPIDEMIA TYPE: ICD-10-CM

## 2019-12-27 DIAGNOSIS — I10 HTN (HYPERTENSION), BENIGN: ICD-10-CM

## 2019-12-27 DIAGNOSIS — I42.9 CARDIOMYOPATHY, UNSPECIFIED TYPE (HCC): ICD-10-CM

## 2019-12-27 PROCEDURE — 3078F DIAST BP <80 MM HG: CPT | Performed by: INTERNAL MEDICINE

## 2019-12-27 PROCEDURE — 3075F SYST BP GE 130 - 139MM HG: CPT | Performed by: INTERNAL MEDICINE

## 2019-12-27 PROCEDURE — 99214 OFFICE O/P EST MOD 30 MIN: CPT | Performed by: INTERNAL MEDICINE

## 2019-12-27 RX ORDER — SPIRONOLACTONE 25 MG/1
25 TABLET ORAL DAILY
Qty: 90 TABLET | Refills: 3 | Status: SHIPPED | OUTPATIENT
Start: 2019-12-27

## 2019-12-27 RX ORDER — METOPROLOL SUCCINATE 100 MG/1
150 TABLET, EXTENDED RELEASE ORAL DAILY
Qty: 145 TABLET | Refills: 5 | Status: SHIPPED | OUTPATIENT
Start: 2019-12-27

## 2019-12-27 RX ORDER — ATORVASTATIN CALCIUM 80 MG/1
80 TABLET, FILM COATED ORAL DAILY
Qty: 90 TABLET | Refills: 2 | Status: SHIPPED | OUTPATIENT
Start: 2019-12-27

## 2019-12-27 RX ORDER — FUROSEMIDE 40 MG/1
40 TABLET ORAL DAILY
Qty: 90 TABLET | Refills: 3 | Status: SHIPPED | OUTPATIENT
Start: 2019-12-27

## 2019-12-27 RX ORDER — ASPIRIN 81 MG/1
81 TABLET ORAL DAILY
Qty: 90 TABLET | Refills: 3
Start: 2019-12-27

## 2019-12-27 NOTE — PROGRESS NOTES
Heart Failure Outpatient Progress Note - Poppy Hem 37 y o  male MRN: [id number]    @ Encounter: [id number]      Assessment/Plan:    Patient Active Problem List    Diagnosis Date Noted    TYRON (obstructive sleep apnea)      Priority: Low    Diabetes mellitus type 2 in obese ([facility] ) 06/21/2018     Priority: Low    Class 2 obesity due to excess calories in adult 06/21/2018     Priority: Low    Chest pain 06/19/2018     Priority: Low    [facility] 06/19/2018     Priority: Low    Cardiomyopathy, dilated, nonischemic ([facility] ) 03/25/2017     Priority: Low    Chronic combined systolic and diastolic congestive heart failure (HCC) 03/25/2017     Priority: Low    Angina of effort ([facility] ) 03/25/2017     Priority: Low    Incidental pulmonary nodule, > 3mm and < 8mm 03/25/2017     Priority: Low    Fatty liver disease, nonalcoholic 66/56/3037     Priority: Low    Exertional chest pain 03/24/2017     Priority: Low    Elevated troponin 03/24/2017     Priority: Low    Hyperglycemia 03/24/2017     Priority: Low    Leukocytosis 03/24/2017     Priority: Low     # Chronic systolic CHF, Stage C, NYHA 2-3  Etiology: NICM  Prior hx of LVEF <20% recovered to ~40-45% on medical therapy, now re-reduced  Unclear reason for re-crudescence  Patient states compliant with medications  No recent illness  No palpitations  ? Natural progression +/- stress induced +/- viral +/- genetic +/- other  Has had extensive workup in the past     Weight: 282 lbs- up a little but not fluid  NT pro BNP: 9/3/19: 128    Diag:  Echo 9/25/19:  LVEF: 20%  LVEDd: 6 3 cm    Echo 10/3/18:   EF: 15-20%  LVEDd 67 mm    CMR 7/23/18: EF: 25%, LVEDd 72 mm, wall thickness 13 mm  Delayed PEREZ in basal to mide interventricular septum    CMRI 6/23/2015: LVEF ~ 15%, LVIDd 6 7 cm, Mildly reduced RVSF  Mod MR   Intramyocardial fibrosis seen in the basal anteroseptal wall suggestive of an idiopathic cardiomyopathy   Given myocardial edema, this may suggest acute or subacute  Fibrosis of the right ventricular insertion point into the   interventricular septum, suggesting pulmonary hypertension  --TTE 6/17/2015: LVEF ~25%  --TTE 10/8/2015: LVEF ~40%  --TTE 4/7/2016: LVEF ~40-45%    Neurohormonal Blockade:  --Beta-Blocker: Continue metoprolol succinate 150 mg PO daily  --ACEi, ARB or ARNi: entresto 97/103 mg BID  --Aldosterone Receptor Blocker: Continue spironolactone 25 mg PO daily  --Diuretic: Continue lasix 40 mg PO Daily    Sudden Cardiac Death Risk Reduction:  --ICD: Medtronic single chamber ICD 11/12/18  Interrogation 10/10/19: normal device function     Electrical Resynchronization:  --Candidacy for BiV device: Narrow QRS     Advanced Therapies: Will continue to monitor  Hx of recovery in the past  If does not recover, given age, would prefer OHT  He would need to lose a little weight prior  LVAD could be considered as bridge, but will need to evaluate RV function further prior to consideration       # HLD:  Atorvastatin 80 mg PO daily  6/20/18: , HDL 32  # HTN- has room for GDMT for HF  # DMII  # Obesity, BMI ~40  # TYRON- 7/28/18 severe sleep apnea- could not tolerate CPAP  # Fatty liver disease    TODAY'S PLAN:  Needs to lose weight  Moving to [place] soon  --2g sodium diet  - Daily weights    HPI:   36 yo male who follows for NIC  Initial hospitalization 6/17 to 6/24/15 with acute heart failure  CM likely due to subacute myocarditis  On 6/17/15 a 2 D echocardiogram showed LV was mildly to moderately dilated  Systolic function was severely reduced with an EF of 26%    A cardiac catheterization was done which showed no CAD  A Cardiac MRI was done which showed severe dilated ventricles and some fibrosis  It was felt his CM was consistent with subacute myocarditis  He was discharged on a Life Vest  On follow up 10/16 he was doing better  His EF was up to 40% on 10/16 echo  He had no decompensated HF symptoms  He sent LifeVEst back   I uptitrated meds, he quit smoking , but he was eating poorly  Had been terminated from job due to inability to do tasks  Ad[facility] to [facility]/19/18 with CP and SOB  Stress negative for ischemia  EF < 20% and LVEDd 6 7 cm  Saw Dr Dhara Lambert in follow up and lisinopril changed to Entresto 24/26 mg BID  ICD implant done on 11/12/18    Interval History:  Echo 9/25/19:  LVEF: 20%  LVEDd: 6 3 cm    Interrogation 10/10/19: normal device function    Moving to [place], wife left him and kids    Breathing is ok, slowly gaining weight    Review of Systems   Constitutional: Negative for activity change, appetite change, fatigue and unexpected weight change  HENT: Negative for congestion and nosebleeds  Eyes: Negative  Respiratory: Negative for cough, chest tightness and shortness of breath  Cardiovascular: Negative for chest pain, palpitations and leg swelling  Gastrointestinal: Negative for abdominal distention  Endocrine: Negative  Genitourinary: Negative  Musculoskeletal: Negative  Skin: Negative  Neurological: Positive for light-headedness (occasional in am)  Negative for dizziness, syncope and weakness  Hematological: Negative  Psychiatric/Behavioral: Negative  Past Medical History:   Diagnosis Date    CHF (congestive heart failure) (HCC)        Allergies   Allergen Reactions    Prednisone Shortness Of Breath and Edema           Current Outpatient Medications:     aspirin (ECOTRIN LOW STRENGTH) 81 mg EC tablet, Take 1 tablet (81 mg total) by mouth daily, Disp: , Rfl: 0    atorvastatin (LIPITOR) 80 mg tablet, Take 1 tablet (80 mg total) by mouth daily, Disp: 90 tablet, Rfl: 2    ENTRESTO  MG TABS, TAKE 1 TABLET BY MOUTH TWICE DAILY, Disp: 30 tablet, Rfl: 3    furosemide (LASIX) 40 mg tablet, Take 1 tablet (40 mg total) by mouth 2 (two) times a day (Patient taking differently: Take 40 mg by mouth daily ), Disp: 180 tablet, Rfl: 3    metoprolol succinate (TOPROL-XL) 100 mg 24 hr tablet, TAKE 1 & 1/2 (ONE & ONE-HALF) TABLETS BY MOUTH ONCE DAILY, Disp: 45 tablet, Rfl: 5    spironolactone (ALDACTONE) 25 mg tablet, Take 1 tablet (25 mg total) by mouth daily, Disp: 90 tablet, Rfl: 3    Social History     Socioeconomic History    Marital status: /Civil Union     Spouse name: Not on file    Number of children: Not on file    Years of education: Not on file    Highest education level: Not on file   Occupational History    Not on file   Social Needs    Financial resource strain: Not on file    Food insecurity:     Worry: Not on file     Inability: Not on file    Transportation needs:     Medical: Not on file     Non-medical: Not on file   Tobacco Use    Smoking status: Former Smoker     Packs/day:      Years: 17      Pack years: 17      Last attempt to quit:      Years since quittin 9    Smokeless tobacco: Never Used   Substance and Sexual Activity    Alcohol use: No     Comment: rare    Drug use: No    Sexual activity: Not on file   Lifestyle    Physical activity:     Days per week: Not on file     Minutes per session: Not on file    Stress: Not on file   Relationships    Social connections:     Talks on phone: Not on file     Gets together: Not on file     Attends [employer] service: Not on file     Active member of club or organization: Not on file     Attends meetings of clubs or organizations: Not on file     Relationship status: Not on file    Intimate partner violence:     Fear of current or ex partner: Not on file     Emotionally abused: Not on file     Physically abused: Not on file     Forced sexual activity: Not on file   Other Topics Concern    Not on file   Social History Narrative    Not on file       Family History   Problem Relation Age of Onset    Hypertension Mother     Diabetes Father     Asthma Maternal Grandmother     Hypertension Maternal Grandmother        Physical Exam:    Vitals:   Vitals:    19 0844   BP: 130/78 Pulse: 84     Wt Readings from Last 3 Encounters:   12/27/19 128 kg (282 lb 3 2 oz)   09/18/19 125 kg (274 lb 14 4 oz)   06/13/19 121 kg (266 lb)       Physical Exam:    Physical Exam   Constitutional: He is oriented to person, place, and time  He appears well-developed and well-nourished  HENT:   Head: Normocephalic and atraumatic  Eyes: Pupils are equal, round, and reactive to light  EOM are normal    Neck: Normal range of motion  No JVD present  Cardiovascular: Normal rate, regular rhythm and normal heart sounds  No murmur heard  Pulmonary/Chest: Effort normal and breath sounds normal  He has no rales  Abdominal: Soft  Bowel sounds are normal  He exhibits no distension  Musculoskeletal: Normal range of motion  He exhibits no edema  Neurological: He is alert and oriented to person, place, and time  Skin: Skin is warm and dry  He is not diaphoretic  Psychiatric: He has a normal mood and affect  Labs & Results:    Lab Results   Component Value Date    GLUCOSE 98 08/06/2015    CALCIUM 9 1 09/03/2019     08/06/2015    K 4 0 09/03/2019    CO2 29 09/03/2019     09/03/2019    BUN 17 09/03/2019    CREATININE 1 11 09/03/2019     Lab Results   Component Value Date    WBC 9 95 09/03/2019    HGB 15 1 09/03/2019    HCT 47 2 09/03/2019    MCV 91 09/03/2019     09/03/2019     Lab Results   Component Value Date    NTBNP 128 (H) 09/03/2019      Lab Results   Component Value Date    CHOL 121 06/17/2015     Lab Results   Component Value Date    HDL 32 (L) 06/20/2018    HDL 32 (L) 03/25/2017    HDL 30 06/17/2015     Lab Results   Component Value Date    LDLCALC 138 (H) 06/20/2018    LDLCALC 118 (H) 03/25/2017    LDLCALC 72 06/17/2015     Lab Results   Component Value Date    TRIG 78 06/20/2018    TRIG 197 (H) 03/25/2017    TRIG 95 06/17/2015     No results found for: CHOLHDL    EKG personally reviewed by Wicho Silva DO       Counseling / Coordination of Care  Total floor / unit time spent today 25 minutes  Greater than 50% of total time was spent with the patient and / or family counseling and / or coordination of care  A description of the counseling / coordination of care: 15      Thank you for the opportunity to participate in the care of this patient    ASHLEY HOLLOWAY [address]

## 2020-01-20 ENCOUNTER — TELEPHONE (OUTPATIENT)
Dept: CARDIOLOGY CLINIC | Facility: CLINIC | Age: 40
End: 2020-01-20

## 2020-03-30 DIAGNOSIS — I50.22 CHRONIC SYSTOLIC CHF (CONGESTIVE HEART FAILURE), NYHA CLASS 2 (HCC): ICD-10-CM

## 2020-03-30 RX ORDER — SACUBITRIL AND VALSARTAN 97; 103 MG/1; MG/1
TABLET, FILM COATED ORAL
Qty: 30 TABLET | Refills: 5 | Status: SHIPPED | OUTPATIENT
Start: 2020-03-30

## 2020-07-08 ENCOUNTER — REMOTE DEVICE CLINIC VISIT (OUTPATIENT)
Dept: CARDIOLOGY CLINIC | Facility: CLINIC | Age: 40
End: 2020-07-08
Payer: COMMERCIAL

## 2020-07-08 DIAGNOSIS — Z95.810 PRESENCE OF IMPLANTABLE CARDIOVERTER-DEFIBRILLATOR (ICD): Primary | ICD-10-CM

## 2020-07-08 PROCEDURE — 93296 REM INTERROG EVL PM/IDS: CPT | Performed by: INTERNAL MEDICINE

## 2020-07-08 PROCEDURE — 93295 DEV INTERROG REMOTE 1/2/MLT: CPT | Performed by: INTERNAL MEDICINE

## 2020-07-08 NOTE — PROGRESS NOTES
MDT SINGLE CHAMBER ICD  CARELINK TRANSMISSION:  BATTERY VOLTAGE ADEQUATE (10 6 YR)    0%   ALL LEAD PARAMETERS WITHIN NORMAL LIMITS   1 HR-NS EPISODE WITH EGM SHOWING NSVT (5 @ 390MS WITH INT  T WAVE OVERSENSING OF PREMATURE COMPLEXES)   4 NSVT EPISODES (7 @ 182 BPM, 12 @ 201 BPM, 8 @ 190 BPM, 6 @ 214 BPM)    TASK TO DR DA   OPTI-VOL WITHIN NORMAL LIMITS   NORMAL DEVICE FUNCTION   RG

## 2020-10-07 NOTE — ANESTHESIA PREPROCEDURE EVALUATION
Review of Systems/Medical History  Patient summary reviewed  Chart reviewed  No history of anesthetic complications     Cardiovascular  EKG reviewed, Hyperlipidemia, Hypertension ,   Comment: Dilated cardiomyopathy with EF 15%,  Pulmonary  Sleep apnea CPAP,        GI/Hepatic    Liver disease (fatty liver) ,   Comment: Confirmed NPO appropriate     Negative  ROS   Comment: 6/21/18 SCr 1 3     Endo/Other  Diabetes (HgbA1c 6 9% June 2018) well controlled Diet controlled,   Obesity    GYN       Hematology  Negative hematology ROS      Musculoskeletal  Negative musculoskeletal ROS        Neurology  Negative neurology ROS      Psychology   Negative psychology ROS              Physical Exam    Airway    Mallampati score: III  TM Distance: >3 FB  Neck ROM: full     Dental   No notable dental hx     Cardiovascular  Rhythm: regular, No murmur,     Pulmonary  Breath sounds clear to auscultation,     Other Findings    10/3/18 TTE:  LEFT VENTRICLE:  The ventricle was moderately dilated  Ejection fraction was estimated to be 15 %  No major change from echocardiogram in June 2018  RIGHT VENTRICLE: The size was normal  Systolic function was normal  Wall thickness was normal     6/20/18 SPECT:  IMPRESSIONS: Abnormal study  Moderate sized, moderate intensity fixed inferior defect likely representing diaphrgmatic attenuation  ( Inferior defect gets better with prone imaging)  No ischemia  Left ventricular systolic function was  severely reduced  Anesthesia Plan  ASA Score- 3     Anesthesia Type- IV sedation with anesthesia with ASA Monitors  Additional Monitors:   Airway Plan:     Comment: I discussed the risks and benefits of IV sedation anesthesia including the possibility of the need to convert to general anesthesia and the potential risk of awareness  Plan Factors-    Induction- intravenous  Postoperative Plan-     Informed Consent- Anesthetic plan and risks discussed with patient  Number Of Freeze-Thaw Cycles: 2 freeze-thaw cycles Render Note In Bullet Format When Appropriate: No Medical Necessity Clause: This procedure was medically necessary because the lesions that were treated were: Medical Necessity Information: It is in your best interest to select a reason for this procedure from the list below. All of these items fulfill various CMS LCD requirements except the new and changing color options. Post-Care Instructions: I reviewed with the patient in detail post-care instructions. Patient is to wear sunprotection, and avoid picking at any of the treated lesions. Pt may apply Vaseline to crusted or scabbing areas. Duration Of Freeze Thaw-Cycle (Seconds): 5-10 Detail Level: Detailed Consent: The patient's consent was obtained including but not limited to risks of crusting, scabbing, blistering, scarring, darker or lighter pigmentary change, recurrence, incomplete removal and infection.

## 2020-10-21 ENCOUNTER — REMOTE DEVICE CLINIC VISIT (OUTPATIENT)
Dept: CARDIOLOGY CLINIC | Facility: CLINIC | Age: 40
End: 2020-10-21
Payer: COMMERCIAL

## 2020-10-21 DIAGNOSIS — Z95.810 PRESENCE OF AUTOMATIC CARDIOVERTER/DEFIBRILLATOR (AICD): Primary | ICD-10-CM

## 2020-10-21 PROCEDURE — 93295 DEV INTERROG REMOTE 1/2/MLT: CPT | Performed by: INTERNAL MEDICINE

## 2020-10-21 PROCEDURE — 93296 REM INTERROG EVL PM/IDS: CPT | Performed by: INTERNAL MEDICINE
